# Patient Record
Sex: FEMALE | Race: WHITE | NOT HISPANIC OR LATINO | URBAN - METROPOLITAN AREA
[De-identification: names, ages, dates, MRNs, and addresses within clinical notes are randomized per-mention and may not be internally consistent; named-entity substitution may affect disease eponyms.]

---

## 2022-03-07 ENCOUNTER — INPATIENT (INPATIENT)
Facility: HOSPITAL | Age: 32
LOS: 1 days | Discharge: HOME | End: 2022-03-09
Attending: OBSTETRICS & GYNECOLOGY | Admitting: OBSTETRICS & GYNECOLOGY
Payer: COMMERCIAL

## 2022-03-07 ENCOUNTER — RESULT REVIEW (OUTPATIENT)
Age: 32
End: 2022-03-07

## 2022-03-07 VITALS
HEART RATE: 122 BPM | SYSTOLIC BLOOD PRESSURE: 146 MMHG | DIASTOLIC BLOOD PRESSURE: 84 MMHG | RESPIRATION RATE: 20 BRPM | OXYGEN SATURATION: 99 %

## 2022-03-07 LAB
ALBUMIN SERPL ELPH-MCNC: 3.5 G/DL — SIGNIFICANT CHANGE UP (ref 3.5–5.2)
ALBUMIN SERPL ELPH-MCNC: 3.5 G/DL — SIGNIFICANT CHANGE UP (ref 3.5–5.2)
ALP SERPL-CCNC: 79 U/L — SIGNIFICANT CHANGE UP (ref 30–115)
ALP SERPL-CCNC: 81 U/L — SIGNIFICANT CHANGE UP (ref 30–115)
ALT FLD-CCNC: 28 U/L — SIGNIFICANT CHANGE UP (ref 0–41)
ALT FLD-CCNC: 30 U/L — SIGNIFICANT CHANGE UP (ref 0–41)
AMPHET UR-MCNC: NEGATIVE — SIGNIFICANT CHANGE UP
ANION GAP SERPL CALC-SCNC: 11 MMOL/L — SIGNIFICANT CHANGE UP (ref 7–14)
ANION GAP SERPL CALC-SCNC: 12 MMOL/L — SIGNIFICANT CHANGE UP (ref 7–14)
APPEARANCE UR: CLEAR — SIGNIFICANT CHANGE UP
APTT BLD: 32 SEC — SIGNIFICANT CHANGE UP (ref 27–39.2)
APTT BLD: 32 SEC — SIGNIFICANT CHANGE UP (ref 27–39.2)
AST SERPL-CCNC: 19 U/L — SIGNIFICANT CHANGE UP (ref 0–41)
AST SERPL-CCNC: 19 U/L — SIGNIFICANT CHANGE UP (ref 0–41)
BACTERIA # UR AUTO: NEGATIVE — SIGNIFICANT CHANGE UP
BARBITURATES UR SCN-MCNC: NEGATIVE — SIGNIFICANT CHANGE UP
BASOPHILS # BLD AUTO: 0.05 K/UL — SIGNIFICANT CHANGE UP (ref 0–0.2)
BASOPHILS # BLD AUTO: 0.06 K/UL — SIGNIFICANT CHANGE UP (ref 0–0.2)
BASOPHILS # BLD AUTO: 0.06 K/UL — SIGNIFICANT CHANGE UP (ref 0–0.2)
BASOPHILS NFR BLD AUTO: 0.2 % — SIGNIFICANT CHANGE UP (ref 0–1)
BASOPHILS NFR BLD AUTO: 0.4 % — SIGNIFICANT CHANGE UP (ref 0–1)
BASOPHILS NFR BLD AUTO: 0.4 % — SIGNIFICANT CHANGE UP (ref 0–1)
BENZODIAZ UR-MCNC: NEGATIVE — SIGNIFICANT CHANGE UP
BILIRUB SERPL-MCNC: <0.2 MG/DL — SIGNIFICANT CHANGE UP (ref 0.2–1.2)
BILIRUB SERPL-MCNC: <0.2 MG/DL — SIGNIFICANT CHANGE UP (ref 0.2–1.2)
BILIRUB UR-MCNC: NEGATIVE — SIGNIFICANT CHANGE UP
BLD GP AB SCN SERPL QL: SIGNIFICANT CHANGE UP
BUN SERPL-MCNC: 12 MG/DL — SIGNIFICANT CHANGE UP (ref 10–20)
BUN SERPL-MCNC: 13 MG/DL — SIGNIFICANT CHANGE UP (ref 10–20)
BUPRENORPHINE SCREEN, URINE RESULT: NEGATIVE — SIGNIFICANT CHANGE UP
C TRACH RRNA SPEC QL NAA+PROBE: SIGNIFICANT CHANGE UP
CALCIUM SERPL-MCNC: 9.6 MG/DL — SIGNIFICANT CHANGE UP (ref 8.5–10.1)
CALCIUM SERPL-MCNC: 9.6 MG/DL — SIGNIFICANT CHANGE UP (ref 8.5–10.1)
CHLORIDE SERPL-SCNC: 103 MMOL/L — SIGNIFICANT CHANGE UP (ref 98–110)
CHLORIDE SERPL-SCNC: 105 MMOL/L — SIGNIFICANT CHANGE UP (ref 98–110)
CO2 SERPL-SCNC: 20 MMOL/L — SIGNIFICANT CHANGE UP (ref 17–32)
CO2 SERPL-SCNC: 20 MMOL/L — SIGNIFICANT CHANGE UP (ref 17–32)
COCAINE METAB.OTHER UR-MCNC: NEGATIVE — SIGNIFICANT CHANGE UP
COLOR SPEC: SIGNIFICANT CHANGE UP
CREAT ?TM UR-MCNC: 74 MG/DL — SIGNIFICANT CHANGE UP
CREAT SERPL-MCNC: 0.6 MG/DL — LOW (ref 0.7–1.5)
CREAT SERPL-MCNC: 0.6 MG/DL — LOW (ref 0.7–1.5)
DIFF PNL FLD: ABNORMAL
EGFR: 121 ML/MIN/1.73M2 — SIGNIFICANT CHANGE UP
EGFR: 121 ML/MIN/1.73M2 — SIGNIFICANT CHANGE UP
EOSINOPHIL # BLD AUTO: 0.01 K/UL — SIGNIFICANT CHANGE UP (ref 0–0.7)
EOSINOPHIL # BLD AUTO: 0.08 K/UL — SIGNIFICANT CHANGE UP (ref 0–0.7)
EOSINOPHIL # BLD AUTO: 0.18 K/UL — SIGNIFICANT CHANGE UP (ref 0–0.7)
EOSINOPHIL NFR BLD AUTO: 0 % — SIGNIFICANT CHANGE UP (ref 0–8)
EOSINOPHIL NFR BLD AUTO: 0.5 % — SIGNIFICANT CHANGE UP (ref 0–8)
EOSINOPHIL NFR BLD AUTO: 1.1 % — SIGNIFICANT CHANGE UP (ref 0–8)
EPI CELLS # UR: 3 /HPF — SIGNIFICANT CHANGE UP (ref 0–5)
FENTANYL UR QL: NEGATIVE — SIGNIFICANT CHANGE UP
GLUCOSE SERPL-MCNC: 88 MG/DL — SIGNIFICANT CHANGE UP (ref 70–99)
GLUCOSE SERPL-MCNC: 89 MG/DL — SIGNIFICANT CHANGE UP (ref 70–99)
GLUCOSE UR QL: NEGATIVE — SIGNIFICANT CHANGE UP
HBV SURFACE AG SER-ACNC: SIGNIFICANT CHANGE UP
HCT VFR BLD CALC: 29.5 % — LOW (ref 37–47)
HCT VFR BLD CALC: 32.8 % — LOW (ref 37–47)
HCT VFR BLD CALC: 35 % — LOW (ref 37–47)
HGB BLD-MCNC: 10.3 G/DL — LOW (ref 12–16)
HGB BLD-MCNC: 11.1 G/DL — LOW (ref 12–16)
HGB BLD-MCNC: 11.8 G/DL — LOW (ref 12–16)
HIV 1 & 2 AB SERPL IA.RAPID: SIGNIFICANT CHANGE UP
HYALINE CASTS # UR AUTO: 2 /LPF — SIGNIFICANT CHANGE UP (ref 0–7)
IMM GRANULOCYTES NFR BLD AUTO: 0.6 % — HIGH (ref 0.1–0.3)
IMM GRANULOCYTES NFR BLD AUTO: 0.7 % — HIGH (ref 0.1–0.3)
IMM GRANULOCYTES NFR BLD AUTO: 0.7 % — HIGH (ref 0.1–0.3)
INR BLD: 0.91 RATIO — SIGNIFICANT CHANGE UP (ref 0.65–1.3)
INR BLD: 0.93 RATIO — SIGNIFICANT CHANGE UP (ref 0.65–1.3)
KETONES UR-MCNC: NEGATIVE — SIGNIFICANT CHANGE UP
L&D DRUG SCREEN, URINE: SIGNIFICANT CHANGE UP
LDH SERPL L TO P-CCNC: 131 — SIGNIFICANT CHANGE UP (ref 50–242)
LEUKOCYTE ESTERASE UR-ACNC: NEGATIVE — SIGNIFICANT CHANGE UP
LYMPHOCYTES # BLD AUTO: 1.27 K/UL — SIGNIFICANT CHANGE UP (ref 1.2–3.4)
LYMPHOCYTES # BLD AUTO: 14.6 % — LOW (ref 20.5–51.1)
LYMPHOCYTES # BLD AUTO: 2.46 K/UL — SIGNIFICANT CHANGE UP (ref 1.2–3.4)
LYMPHOCYTES # BLD AUTO: 20 % — LOW (ref 20.5–51.1)
LYMPHOCYTES # BLD AUTO: 3.41 K/UL — HIGH (ref 1.2–3.4)
LYMPHOCYTES # BLD AUTO: 4.9 % — LOW (ref 20.5–51.1)
MCHC RBC-ENTMCNC: 28.8 PG — SIGNIFICANT CHANGE UP (ref 27–31)
MCHC RBC-ENTMCNC: 29.1 PG — SIGNIFICANT CHANGE UP (ref 27–31)
MCHC RBC-ENTMCNC: 29.7 PG — SIGNIFICANT CHANGE UP (ref 27–31)
MCHC RBC-ENTMCNC: 33.7 G/DL — SIGNIFICANT CHANGE UP (ref 32–37)
MCHC RBC-ENTMCNC: 33.8 G/DL — SIGNIFICANT CHANGE UP (ref 32–37)
MCHC RBC-ENTMCNC: 34.9 G/DL — SIGNIFICANT CHANGE UP (ref 32–37)
MCV RBC AUTO: 85 FL — SIGNIFICANT CHANGE UP (ref 81–99)
MCV RBC AUTO: 85.4 FL — SIGNIFICANT CHANGE UP (ref 81–99)
MCV RBC AUTO: 86.1 FL — SIGNIFICANT CHANGE UP (ref 81–99)
METHADONE UR-MCNC: NEGATIVE — SIGNIFICANT CHANGE UP
MEV IGG SER-ACNC: 107 AU/ML — SIGNIFICANT CHANGE UP
MEV IGG+IGM SER-IMP: POSITIVE — SIGNIFICANT CHANGE UP
MONOCYTES # BLD AUTO: 0.67 K/UL — HIGH (ref 0.1–0.6)
MONOCYTES # BLD AUTO: 0.86 K/UL — HIGH (ref 0.1–0.6)
MONOCYTES # BLD AUTO: 1.04 K/UL — HIGH (ref 0.1–0.6)
MONOCYTES NFR BLD AUTO: 2.6 % — SIGNIFICANT CHANGE UP (ref 1.7–9.3)
MONOCYTES NFR BLD AUTO: 5.1 % — SIGNIFICANT CHANGE UP (ref 1.7–9.3)
MONOCYTES NFR BLD AUTO: 6.1 % — SIGNIFICANT CHANGE UP (ref 1.7–9.3)
N GONORRHOEA RRNA SPEC QL NAA+PROBE: SIGNIFICANT CHANGE UP
NEUTROPHILS # BLD AUTO: 12.26 K/UL — HIGH (ref 1.4–6.5)
NEUTROPHILS # BLD AUTO: 13.28 K/UL — HIGH (ref 1.4–6.5)
NEUTROPHILS # BLD AUTO: 23.75 K/UL — HIGH (ref 1.4–6.5)
NEUTROPHILS NFR BLD AUTO: 71.8 % — SIGNIFICANT CHANGE UP (ref 42.2–75.2)
NEUTROPHILS NFR BLD AUTO: 78.7 % — HIGH (ref 42.2–75.2)
NEUTROPHILS NFR BLD AUTO: 91.6 % — HIGH (ref 42.2–75.2)
NITRITE UR-MCNC: NEGATIVE — SIGNIFICANT CHANGE UP
NRBC # BLD: 0 /100 WBCS — SIGNIFICANT CHANGE UP (ref 0–0)
OPIATES UR-MCNC: NEGATIVE — SIGNIFICANT CHANGE UP
OXYCODONE UR-MCNC: NEGATIVE — SIGNIFICANT CHANGE UP
PCP UR-MCNC: NEGATIVE — SIGNIFICANT CHANGE UP
PH UR: 6 — SIGNIFICANT CHANGE UP (ref 5–8)
PLATELET # BLD AUTO: 355 K/UL — SIGNIFICANT CHANGE UP (ref 130–400)
PLATELET # BLD AUTO: 365 K/UL — SIGNIFICANT CHANGE UP (ref 130–400)
PLATELET # BLD AUTO: 370 K/UL — SIGNIFICANT CHANGE UP (ref 130–400)
POTASSIUM SERPL-MCNC: 4 MMOL/L — SIGNIFICANT CHANGE UP (ref 3.5–5)
POTASSIUM SERPL-MCNC: 4.2 MMOL/L — SIGNIFICANT CHANGE UP (ref 3.5–5)
POTASSIUM SERPL-SCNC: 4 MMOL/L — SIGNIFICANT CHANGE UP (ref 3.5–5)
POTASSIUM SERPL-SCNC: 4.2 MMOL/L — SIGNIFICANT CHANGE UP (ref 3.5–5)
PROPOXYPHENE QUALITATIVE URINE RESULT: NEGATIVE — SIGNIFICANT CHANGE UP
PROT ?TM UR-MCNC: 12 MG/DLG/24H — SIGNIFICANT CHANGE UP
PROT SERPL-MCNC: 6.1 G/DL — SIGNIFICANT CHANGE UP (ref 6–8)
PROT SERPL-MCNC: 6.2 G/DL — SIGNIFICANT CHANGE UP (ref 6–8)
PROT UR-MCNC: NEGATIVE — SIGNIFICANT CHANGE UP
PROT/CREAT UR-RTO: 0.2 RATIO — SIGNIFICANT CHANGE UP (ref 0–0.2)
PROTHROM AB SERPL-ACNC: 10.5 SEC — SIGNIFICANT CHANGE UP (ref 9.95–12.87)
PROTHROM AB SERPL-ACNC: 10.7 SEC — SIGNIFICANT CHANGE UP (ref 9.95–12.87)
RBC # BLD: 3.47 M/UL — LOW (ref 4.2–5.4)
RBC # BLD: 3.81 M/UL — LOW (ref 4.2–5.4)
RBC # BLD: 4.1 M/UL — LOW (ref 4.2–5.4)
RBC # FLD: 14.9 % — HIGH (ref 11.5–14.5)
RBC # FLD: 15.1 % — HIGH (ref 11.5–14.5)
RBC # FLD: 15.2 % — HIGH (ref 11.5–14.5)
RBC CASTS # UR COMP ASSIST: 4 /HPF — SIGNIFICANT CHANGE UP (ref 0–4)
RUBV IGG SER-ACNC: 5.6 INDEX — SIGNIFICANT CHANGE UP
RUBV IGG SER-IMP: POSITIVE — SIGNIFICANT CHANGE UP
SARS-COV-2 RNA SPEC QL NAA+PROBE: SIGNIFICANT CHANGE UP
SODIUM SERPL-SCNC: 135 MMOL/L — SIGNIFICANT CHANGE UP (ref 135–146)
SODIUM SERPL-SCNC: 136 MMOL/L — SIGNIFICANT CHANGE UP (ref 135–146)
SP GR SPEC: 1.01 — SIGNIFICANT CHANGE UP (ref 1.01–1.03)
SPECIMEN SOURCE: SIGNIFICANT CHANGE UP
T PALLIDUM AB TITR SER: NEGATIVE — SIGNIFICANT CHANGE UP
URATE SERPL-MCNC: 3.3 MG/DL — SIGNIFICANT CHANGE UP (ref 2.5–7)
UROBILINOGEN FLD QL: SIGNIFICANT CHANGE UP
WBC # BLD: 16.86 K/UL — HIGH (ref 4.8–10.8)
WBC # BLD: 17.05 K/UL — HIGH (ref 4.8–10.8)
WBC # BLD: 25.94 K/UL — HIGH (ref 4.8–10.8)
WBC # FLD AUTO: 16.86 K/UL — HIGH (ref 4.8–10.8)
WBC # FLD AUTO: 17.05 K/UL — HIGH (ref 4.8–10.8)
WBC # FLD AUTO: 25.94 K/UL — HIGH (ref 4.8–10.8)
WBC UR QL: 1 /HPF — SIGNIFICANT CHANGE UP (ref 0–5)

## 2022-03-07 PROCEDURE — 99222 1ST HOSP IP/OBS MODERATE 55: CPT | Mod: 25

## 2022-03-07 PROCEDURE — 76815 OB US LIMITED FETUS(S): CPT | Mod: 26

## 2022-03-07 PROCEDURE — 59025 FETAL NON-STRESS TEST: CPT | Mod: 26

## 2022-03-07 PROCEDURE — 59514 CESAREAN DELIVERY ONLY: CPT | Mod: U7

## 2022-03-07 PROCEDURE — 88305 TISSUE EXAM BY PATHOLOGIST: CPT | Mod: 26

## 2022-03-07 PROCEDURE — 99285 EMERGENCY DEPT VISIT HI MDM: CPT | Mod: 25

## 2022-03-07 RX ORDER — CEFAZOLIN SODIUM 1 G
2000 VIAL (EA) INJECTION ONCE
Refills: 0 | Status: DISCONTINUED | OUTPATIENT
Start: 2022-03-07 | End: 2022-03-08

## 2022-03-07 RX ORDER — SENNA PLUS 8.6 MG/1
2 TABLET ORAL AT BEDTIME
Refills: 0 | Status: DISCONTINUED | OUTPATIENT
Start: 2022-03-07 | End: 2022-03-09

## 2022-03-07 RX ORDER — SODIUM CHLORIDE 9 MG/ML
1000 INJECTION, SOLUTION INTRAVENOUS
Refills: 0 | Status: DISCONTINUED | OUTPATIENT
Start: 2022-03-07 | End: 2022-03-07

## 2022-03-07 RX ORDER — SODIUM CHLORIDE 9 MG/ML
1000 INJECTION, SOLUTION INTRAVENOUS
Refills: 0 | Status: DISCONTINUED | OUTPATIENT
Start: 2022-03-07 | End: 2022-03-08

## 2022-03-07 RX ORDER — SIMETHICONE 80 MG/1
80 TABLET, CHEWABLE ORAL EVERY 4 HOURS
Refills: 0 | Status: DISCONTINUED | OUTPATIENT
Start: 2022-03-07 | End: 2022-03-07

## 2022-03-07 RX ORDER — ENOXAPARIN SODIUM 100 MG/ML
40 INJECTION SUBCUTANEOUS EVERY 24 HOURS
Refills: 0 | Status: DISCONTINUED | OUTPATIENT
Start: 2022-03-07 | End: 2022-03-09

## 2022-03-07 RX ORDER — OXYTOCIN 10 UNIT/ML
333.33 VIAL (ML) INJECTION
Qty: 20 | Refills: 0 | Status: DISCONTINUED | OUTPATIENT
Start: 2022-03-07 | End: 2022-03-09

## 2022-03-07 RX ORDER — TETANUS TOXOID, REDUCED DIPHTHERIA TOXOID AND ACELLULAR PERTUSSIS VACCINE, ADSORBED 5; 2.5; 8; 8; 2.5 [IU]/.5ML; [IU]/.5ML; UG/.5ML; UG/.5ML; UG/.5ML
0.5 SUSPENSION INTRAMUSCULAR ONCE
Refills: 0 | Status: DISCONTINUED | OUTPATIENT
Start: 2022-03-07 | End: 2022-03-09

## 2022-03-07 RX ORDER — MAGNESIUM HYDROXIDE 400 MG/1
30 TABLET, CHEWABLE ORAL
Refills: 0 | Status: DISCONTINUED | OUTPATIENT
Start: 2022-03-07 | End: 2022-03-09

## 2022-03-07 RX ORDER — SIMETHICONE 80 MG/1
80 TABLET, CHEWABLE ORAL EVERY 4 HOURS
Refills: 0 | Status: DISCONTINUED | OUTPATIENT
Start: 2022-03-07 | End: 2022-03-09

## 2022-03-07 RX ORDER — IBUPROFEN 200 MG
600 TABLET ORAL EVERY 6 HOURS
Refills: 0 | Status: DISCONTINUED | OUTPATIENT
Start: 2022-03-07 | End: 2022-03-09

## 2022-03-07 RX ORDER — OXYTOCIN 10 UNIT/ML
333.33 VIAL (ML) INJECTION
Qty: 20 | Refills: 0 | Status: DISCONTINUED | OUTPATIENT
Start: 2022-03-07 | End: 2022-03-07

## 2022-03-07 RX ORDER — ACETAMINOPHEN 500 MG
975 TABLET ORAL
Refills: 0 | Status: DISCONTINUED | OUTPATIENT
Start: 2022-03-07 | End: 2022-03-09

## 2022-03-07 RX ORDER — KETOROLAC TROMETHAMINE 30 MG/ML
30 SYRINGE (ML) INJECTION EVERY 6 HOURS
Refills: 0 | Status: COMPLETED | OUTPATIENT
Start: 2022-03-07 | End: 2022-03-07

## 2022-03-07 RX ORDER — IBUPROFEN 200 MG
600 TABLET ORAL EVERY 6 HOURS
Refills: 0 | Status: COMPLETED | OUTPATIENT
Start: 2022-03-07 | End: 2023-02-03

## 2022-03-07 RX ORDER — OXYCODONE HYDROCHLORIDE 5 MG/1
5 TABLET ORAL ONCE
Refills: 0 | Status: DISCONTINUED | OUTPATIENT
Start: 2022-03-07 | End: 2022-03-09

## 2022-03-07 RX ORDER — INFLUENZA VIRUS VACCINE 15; 15; 15; 15 UG/.5ML; UG/.5ML; UG/.5ML; UG/.5ML
0.5 SUSPENSION INTRAMUSCULAR ONCE
Refills: 0 | Status: DISCONTINUED | OUTPATIENT
Start: 2022-03-07 | End: 2022-03-09

## 2022-03-07 RX ORDER — LANOLIN
1 OINTMENT (GRAM) TOPICAL EVERY 6 HOURS
Refills: 0 | Status: DISCONTINUED | OUTPATIENT
Start: 2022-03-07 | End: 2022-03-09

## 2022-03-07 RX ORDER — OXYCODONE HYDROCHLORIDE 5 MG/1
5 TABLET ORAL
Refills: 0 | Status: DISCONTINUED | OUTPATIENT
Start: 2022-03-07 | End: 2022-03-09

## 2022-03-07 RX ORDER — DIPHENHYDRAMINE HCL 50 MG
25 CAPSULE ORAL EVERY 6 HOURS
Refills: 0 | Status: DISCONTINUED | OUTPATIENT
Start: 2022-03-07 | End: 2022-03-09

## 2022-03-07 RX ADMIN — Medication 600 MILLIGRAM(S): at 14:15

## 2022-03-07 RX ADMIN — SIMETHICONE 80 MILLIGRAM(S): 80 TABLET, CHEWABLE ORAL at 21:42

## 2022-03-07 RX ADMIN — Medication 30 MILLIGRAM(S): at 08:39

## 2022-03-07 RX ADMIN — Medication 12 MILLIGRAM(S): at 03:53

## 2022-03-07 RX ADMIN — ENOXAPARIN SODIUM 40 MILLIGRAM(S): 100 INJECTION SUBCUTANEOUS at 18:55

## 2022-03-07 RX ADMIN — Medication 600 MILLIGRAM(S): at 14:16

## 2022-03-07 RX ADMIN — Medication 975 MILLIGRAM(S): at 21:42

## 2022-03-07 RX ADMIN — Medication 30 MILLIGRAM(S): at 10:25

## 2022-03-07 RX ADMIN — SENNA PLUS 2 TABLET(S): 8.6 TABLET ORAL at 21:42

## 2022-03-07 RX ADMIN — SIMETHICONE 80 MILLIGRAM(S): 80 TABLET, CHEWABLE ORAL at 18:48

## 2022-03-07 NOTE — CHART NOTE - NSCHARTNOTEFT_GEN_A_CORE
OBGYN PGY2 Note    Prenatal charts faxed over from PMD's office (). Charts reviewed.     Final ITALIA: 6/27/22 from prenatal charts faxed, method of dating (LMP vs sonogram) not indicated on chart     Labs:   12/14  Varicella non immune  HIV NR   HepBsAg NR  RPR NR  Rubella immune     12/28   Cystic fibrosis carrier     2/9/22  AB pos    2/11/22  Prenatal FISH analysis - normal     2/23  Amniotic fluid chromosome analysis: 46, XY, normal male karyotype  prenatal     sonograms:   12/22/21 subchorionic hematoma 1.5x 0.9cm  1/5/22 no hematoma visualized   2/8/22 EGA 20w1d, EFW 255g (3%), breech, anterior placenta, anatomy survey appears normal, UA S/D absent end diastolic flow   2/23/22 EGA 22w2d, cephalic, fundal placenta, amniotic fluid normal, AC <3%ile, EFW 341g (<3%) cervix CL 38.8mm, fetal anatomic survey visually normal, UA absent end diastolic flow   3/2/22 EGA 23w2d, cephalic, fundal placenta, no previa, MVP 73.9mm, normal stomach, kidneys, bladder, UA S/D ratio = 5.1 (elevated)     and  aware

## 2022-03-07 NOTE — BRIEF OPERATIVE NOTE - NSICDXBRIEFPREOP_GEN_ALL_CORE_FT
PRE-OP DIAGNOSIS:  Category II fetal heart rate tracing during labor and delivery 07-Mar-2022 06:08:35  Niurka Tabares  Placental abruption, second trimester 07-Mar-2022 06:08:14 suspected Niurka Tabares  24 weeks gestation of pregnancy 07-Mar-2022 06:08:05  Niurka Tabares  Vaginal bleeding 07-Mar-2022 06:07:56  Niurka Tabares

## 2022-03-07 NOTE — ED PROVIDER NOTE - OBJECTIVE STATEMENT
33 yo female  24 weeks (ITALIA - 22) pregnant present c/o sudden onset of vaginal bleeding. Reported she woke up with a blood pooling on bed. her GYN is from [facility] in [place]. Santos abdominal pain and cramping associate with bleeding. denies trauma to abdomen. Also reports she was seen by high risk doctor because her placenta was not growing appropriately at around 18 weeks and she had a sonogram last week and was told it is normal now.

## 2022-03-07 NOTE — OB RN DELIVERY SUMMARY - NS_GENERALBABYACOMMENTA_OBGYN_ALL_OB_FT
Specimen obtained by Dr. Jaramillo during procedure to be sent to pathology to r/o accessory lobe placenta

## 2022-03-07 NOTE — CHART NOTE - NSCHARTNOTEFT_GEN_A_CORE
PACU ANESTHESIA ADMISSION NOTE      Procedure:  c- section - emergent   Post op diagnosis:   placental abruption -> fetal demise     ____  Intubated  TV:______       Rate: ______      FiO2: ______    __x__  Patent Airway    __x__  Full return of protective reflexes    __x__  Full recovery from anesthesia / back to baseline status        Mental Status:  __x__ Awake   ___x__ Alert   _____ Drowsy   _____ Sedated    Nausea/Vomiting:  __x__ NO  ______Yes,   See Post - Op Orders          Pain Scale (0-10):  __0___    Treatment: ____ None    __x__ See Post - Op/PCA Orders    Post - Operative Fluids:   ____ Oral   __x__ See Post - Op Orders    Plan: Discharge:   ____Home       __x___Floor     _____Critical Care    _____  Other:_________________    Comments: Patient had smooth intraoperative event, no anesthesia complication.  PACU Vital signs: HR:    88        BP:   132     /   67       RR:     16        O2 Sat: 99      %

## 2022-03-07 NOTE — OB PROVIDER H&P - NSHPPHYSICALEXAM_GEN_ALL_CORE
Vital Signs Last 24 Hrs  T(C): 36.9 (07 Mar 2022 01:58), Max: 36.9 (07 Mar 2022 01:58)  T(F): 98.4 (07 Mar 2022 01:58), Max: 98.4 (07 Mar 2022 01:58)  HR: 93 (07 Mar 2022 03:45) (82 - 122)  BP: 131/78 (07 Mar 2022 03:45) (119/66 - 146/84)  RR: 20 (07 Mar 2022 01:58) (20 - 20)  SpO2: 98% (07 Mar 2022 03:43) (96% - 99%)    Gen: AOx3, NAD  EFM: 150/moderate, late decels noted with contractions  [place]: q3-4m  SVE 3/7 @0215 : 0/0/-3  Speculum: 5cc bright red blood, neg ferning  BSS: CL 4.11, vertex, MVP 4.5, +movement, +FH, EFW 417g, fundal placenta, no evidence of abruption

## 2022-03-07 NOTE — PROGRESS NOTE ADULT - SUBJECTIVE AND OBJECTIVE BOX
33y/o  @24 weeks admitted with vaginal bleeding  Pt is ctx'ing  ddx: Abruption vs labor  Will Admit   steroids  will not give tocolytics due to abruption  hb 11  Explained to pt  possible need for delivery  risks: Anesthesia, infection, bleeding, transfusion, injury to other organs-bowel/bladder, dvt/pe, future pregnancy risks  discussed prematurity and risks to be explained by peds  all questions answered

## 2022-03-07 NOTE — BRIEF OPERATIVE NOTE - NSICDXBRIEFPOSTOP_GEN_ALL_CORE_FT
POST-OP DIAGNOSIS:  Vaginal bleeding 07-Mar-2022 06:08:48  Niurka Tabares  24 weeks gestation of pregnancy 07-Mar-2022 06:08:44  Niurka Tabares  Placental abruption, second trimester 07-Mar-2022 06:08:40 suspected Niurka Tabares  Category II fetal heart rate tracing during labor and delivery 07-Mar-2022 06:08:38  Niurka Tabares

## 2022-03-07 NOTE — CHART NOTE - NSCHARTNOTEFT_GEN_A_CORE
PGY3 Note    Patient evaluated at bedside for recurrent late decelerations.  Resuscitated with O2 and fluid bolus.  Continues to deny contractions or abdominal pain. Good fetal movement.  Continues to report intermittent vaginal bleeding.    Vital Signs Last 24 Hrs  T(C): 36.9 (07 Mar 2022 01:58), Max: 36.9 (07 Mar 2022 01:58)  T(F): 98.4 (07 Mar 2022 01:58), Max: 98.4 (07 Mar 2022 01:58)  HR: 97 (07 Mar 2022 04:08) (82 - 122)  BP: 141/92 (07 Mar 2022 03:59) (119/66 - 146/84)  BP(mean): --  RR: 20 (07 Mar 2022 01:58) (20 - 20)  SpO2: 100% (07 Mar 2022 04:08) (96% - 100%)    EFM: 150/mod/no accel/intermittent late decelerations  toco: q3-4m      A/P: 31yo  @ 24weeks, GBS unknown, on aspirin, with vaginal bleeding, high suspicion for placental abruption.    - MFM Dr. Rincon contacted, celestone administration recommended.  Delivery recommended if fetal heart rate abnormality persists.  - NPO/IVF  - cont efm/toco  - urgent NICU consult  - anesthesia made aware  - celestone for fetal lung maturity      Dr. Jaramillo aware and at bedside.

## 2022-03-07 NOTE — OB PROVIDER DELIVERY SUMMARY - NSSELHIDDEN_OBGYN_ALL_OB_FT
[NS_[email or web address]_ALL_OB_FT:[email or web address]],[NS_DeliveryAssist1_OBGYN_ALL_OB_FT:[id number]=],[NS_DeliveryRN_OBGYN_ALL_OB_FT:[email or web address]=],[NS_CirculateRN2_OBGYN_ALL_OB_FT:[email or web address]=]

## 2022-03-07 NOTE — OB RN TRIAGE NOTE - NS_ARRIVALINFOCOMMENT_OBGYN_ALL_OB_FT
pt transferred from [facility] via ambulance and stretcher from [facility] ED

## 2022-03-07 NOTE — OB RN INTRAOPERATIVE NOTE - NS_DURAMORPH_OBGYN_ALL_OB
REVIEW OF SYSTEMS:    CONSTITUTIONAL: No weakness, fevers or chills  EYES/ENT: No visual changes;  No vertigo or throat pain   NECK: No pain or stiffness  RESPIRATORY: No cough, wheezing, hemoptysis; + shortness of breath  CARDIOVASCULAR: No chest pain or palpitations  GASTROINTESTINAL: . No nausea, vomiting, or hematemesis; No diarrhea or constipation. No melena or hematochezia. +abdominal pain  GENITOURINARY: No dysuria, frequency or hematuria  NEUROLOGICAL: No numbness or weakness  SKIN: No itching, rashes
with h/o CAD and AAA (5.2cm)   cont Metoprolol, Ramapril, Isosorbide, and Aspirin with holding parameters
No

## 2022-03-07 NOTE — PROGRESS NOTE ADULT - SUBJECTIVE AND OBJECTIVE BOX
PGY1 note  Chief Complaint: Post  section    Patient seen and examined. Pain well controlled at this time. No complaints at this time. Denies fever, chills, nausea, vomiting, chest pain, shortness of breath, severe abdominal pain, heavy vaginal bleeding. Patient is OOB to chair, tolerating PO, spicer in place, denies flatus.       Physical Exam  Vital Signs Last 24 Hrs  T(F): 98.4 (07 Mar 2022 07:18), Max: 98.4 (07 Mar 2022 01:58)  HR: 93 (07 Mar 2022 11:17) (72 - 122)  BP: 116/68 (07 Mar 2022 11:17) (114/64 - 146/84)  RR: 14 (07 Mar 2022 04:21) (14 - 20)    Physical exam:  General - AAOx3, NAD  Heart - S1S2, RRR  Lungs - CTA BL  Abdomen:  - Soft, appropriately tender, mildly distended, BS+. Clean, dry, intact dressing in place over pfannenstiel skin incision.  - Fundus firm, appropriately tender, below the umbilicus  - No rebound or guarding  Pelvis/Vagina - Minimal lochia  Extremities - No calf tenderness, no swelling    (8963-2505) 150, (8347-1074) 150, (8341-6657) 150    Labs:                        11.1   16.86 )-----------( 365      ( 07 Mar 2022 04:01 )             32.8                         11.8   17.05 )-----------( 370      ( 07 Mar 2022 00:25 )             35.0     Antibody Screen: NEG (22 @ 00:25)    ABO RH Interpretation: AB POS (22 @ 00:25)    Antibody Screen: NEG (22 @ 00:25)    MEDICATIONS  (STANDING):  acetaminophen     Tablet .. 975 milliGRAM(s) Oral <User Schedule>  ceFAZolin   IVPB 2000 milliGRAM(s) IV Intermittent once  diphtheria/tetanus/pertussis (acellular) Vaccine (ADAcel) 0.5 milliLiter(s) IntraMuscular once  enoxaparin Injectable 40 milliGRAM(s) SubCutaneous every 24 hours  ibuprofen  Tablet. 600 milliGRAM(s) Oral every 6 hours  ketorolac   Injectable 30 milliGRAM(s) IV Push every 6 hours  lactated ringers. 1000 milliLiter(s) (125 mL/Hr) IV Continuous <Continuous>  oxytocin Infusion 333.333 milliUNIT(s)/Min (1000 mL/Hr) IV Continuous <Continuous>  senna 2 Tablet(s) Oral at bedtime  simethicone 80 milliGRAM(s) Chew every 4 hours    MEDICATIONS  (PRN):  diphenhydrAMINE 25 milliGRAM(s) Oral every 6 hours PRN Pruritus  lanolin Ointment 1 Application(s) Topical every 6 hours PRN Sore Nipples  magnesium hydroxide Suspension 30 milliLiter(s) Oral two times a day PRN Constipation  oxyCODONE    IR 5 milliGRAM(s) Oral every 3 hours PRN Moderate to Severe Pain (4-10)  oxyCODONE    IR 5 milliGRAM(s) Oral once PRN Moderate to Severe Pain (4-10)

## 2022-03-07 NOTE — ED PROVIDER NOTE - PHYSICAL EXAMINATION
CONSTITUTIONAL: Well-appearing; well-nourished; in no apparent distress.   EYES: PERRL; EOM intact.   CARDIOVASCULAR: Normal S1, S2; no murmurs, rubs, or gallops.   RESPIRATORY: Normal chest excursion with respiration; breath sounds clear and equal bilaterally; no wheezes, rhonchi, or rales.  GI: Normal bowel sounds; non-distended; non-tender; no palpable organomegaly.   MS: No calf swelling and tenderness.  SKIN: Normal for age and race; warm; dry; good turgor; no apparent lesions or exudate.   NEURO/PSYCH: A & O x 4; grossly unremarkable.

## 2022-03-07 NOTE — OB RN INTRAOPERATIVE NOTE - NS_ADDITIONALPROCINFO1_OBGYN_ALL_OB_FT
After pt placed down from spinal, EFM placed on abdomen and no FHR audible at this time after much effort.  Dr. Jaramillo at bedside of OR table at this time and called a STAT csection.

## 2022-03-07 NOTE — OB PROVIDER H&P - ATTENDING COMMENTS
See progress Note I wrote  nst non-reassuring See progress Note I wrote  nst non-reassuring  sonogram fluid >2cm  efw done

## 2022-03-07 NOTE — OB PROVIDER TRIAGE NOTE - HISTORY OF PRESENT ILLNESS
33y/o  at 24w0d dated by early sonogram with ITALIA 22, presents for an episode of bleeding at 11:40pm. She states she got up to use the bathroom and immediately felt a gush of red blood. She denies ctx, lof.She Feels good fetal movements. Pregnancy has been complicated by subchorionic hematoma, last bleeding episode around 15 weeks. She states she also had a normal amniocentesis this pregnancy, she states she had the amniocentesis for potential abnormalities with her placenta. She follows with Dr. Bedolla at [facility] for ob care. She has been on aspirin this pregnancy, last dose at 10pm last evening. No other complications this pregnancy. GBS unknown.     33y/o  at 24w0d dated by early sonogram with ITALIA 22, presents for an episode of bleeding at 11:40pm. She states she got up to use the bathroom and immediately felt a gush of red blood. She denies ctx, lof.She Feels good fetal movements. Pregnancy has been complicated by subchorionic hematoma, last bleeding episode around 15 weeks. She states she also had a normal amniocentesis this pregnancy, she states she had the amniocentesis for potential abnormalities with her placenta. She follows with Dr. Bedolla at [facility] for ob care. She has been on aspirin this pregnancy, last dose at 10pm last evening. No other complications this pregnancy. GBS unknown.    Last [place]: months ago  BM: yesterday 9pm   33y/o  at 24w0d dated by early sonogram with ITALIA 22, presents for an episode of bleeding at 11:40pm. She states she got up to use the bathroom and immediately felt a gush of red blood. She denies ctx, lof.She Feels good fetal movements. Pregnancy has been complicated by subchorionic hematoma, last bleeding episode around 15 weeks. She states she also had a normal amniocentesis this pregnancy, she states she had the amniocentesis for potential abnormalities with her placenta. She follows with Dr. Bedolla at [facility] for ob care. She has been on aspirin this pregnancy, last dose at 10pm last evening. No other complications this pregnancy. GBS unknown. Denies fevers, chills, SOB, CP, abdominal pain, nausea, vomiting, diarrhea, constipation, dysuria, urinary urgency or urinary frequency    Last [place]: months ago  BM: yesterday 9pm   31y/o  at 24w0d dated by early sonogram with ITALIA 22, presents for an episode of bleeding at 11:40pm. She states she got up to use the bathroom and immediately felt a gush of red blood. She denies ctx, lof.She Feels good fetal movements. Pregnancy has been complicated by subchorionic hematoma, last bleeding episode around 15 weeks. She states she also had a normal amniocentesis this pregnancy, she states she had the amniocentesis for potential abnormalities with her placenta. She follows with Dr. Bedolla at [facility] for ob care. She has been on aspirin this pregnancy, last dose at 10pm last evening. No other complications this pregnancy. GBS unknown. Denies fevers, chills, SOB, CP, abdominal pain, nausea, vomiting, diarrhea, constipation, dysuria, urinary urgency or urinary frequency.    Last [place]: months ago  BM: yesterday 9pm

## 2022-03-07 NOTE — OB PROVIDER TRIAGE NOTE - NSOBPROVIDERNOTE_OBGYN_ALL_OB_FT
33yo  @ 24 weeks, GBS unknown, on aspirin, with vaginal bleeding, r/o subchorionic hematoma bleed vs. placental abruption, current maternal and fetal status reassuring.    - repeat abruption labs in 4 hours  - PELs for elevated BP on admission  - continuous EFM/TOCO  - NPO   - IVF  - f/u GC  - reevaluate    Dr. Tabares and Dr. De Los Santos aware. 31yo  @ 24 weeks, GBS unknown, on aspirin, with vaginal bleeding, r/o subchorionic hematoma bleed vs. placental abruption, current maternal and fetal status reassuring.    - repeat abruption labs in 4 hours  - PELs for elevated BP on admission  - continuous EFM/TOCO  - NPO   - IVF  - pad counts  - f/u GC  - reevaluate    Dr. Tabares and Dr. De Los Santos aware.

## 2022-03-07 NOTE — CHART NOTE - NSCHARTNOTEFT_GEN_A_CORE
Patient seen at bedside, patient not on EFM/TOCO monitor. Patient placed back on monitor.    Dr. Tabares and Dr. De Los Santos aware.

## 2022-03-07 NOTE — OB PROVIDER H&P - ASSESSMENT
33yo  @24w0d, GBS unknown, with vaginal bleeding, r/o subchorionic hematoma vs placental abruption, now with contractions, currently on aspirin, with category II FHR tracing.    -Admit to L&D  -Admission labs, prenatal labs, repeat abruption labs, PELs  -Monitor vitals  -Cont EFM/[place]  -Pain management prn  -NPO/IVF  -pad counts  -f/u GC/CT  -Celestone 12mg q24h, stop after 2 doses  -resuscitative measures including O2 supplementation, IVF bolus,     Dr. Jaramillo and Dr. Tabares aware

## 2022-03-07 NOTE — OB PROVIDER TRIAGE NOTE - NSHPPHYSICALEXAM_GEN_ALL_CORE
Vital Signs Last 24 Hrs  T(C): 36.9 (07 Mar 2022 01:58), Max: 36.9 (07 Mar 2022 01:58)  T(F): 98.4 (07 Mar 2022 01:58), Max: 98.4 (07 Mar 2022 01:58)  HR: 93 (07 Mar 2022 01:58) (93 - 122)  BP: 125/84 (07 Mar 2022 01:58) (125/84 - 146/84)  RR: 20 (07 Mar 2022 01:58) (20 - 20)  SpO2: 99% (07 Mar 2022 00:11) (99% - 99%)    Gen: AOx3, no acute distress  Abd: soft, gravid, non tender, no palpable contractions  Ext: No edema bilaterally    EFM:  140/mod/+accels; cat 1  [place]: not irene  SVE: 0/0/-3   vertex intact Vital Signs Last 24 Hrs  T(C): 36.9 (07 Mar 2022 01:58), Max: 36.9 (07 Mar 2022 01:58)  T(F): 98.4 (07 Mar 2022 01:58), Max: 98.4 (07 Mar 2022 01:58)  HR: 93 (07 Mar 2022 01:58) (93 - 122)  BP: 125/84 (07 Mar 2022 01:58) (125/84 - 146/84)  RR: 20 (07 Mar 2022 01:58) (20 - 20)  SpO2: 99% (07 Mar 2022 00:11) (99% - 99%)    Gen: AOx3, no acute distress  Abd: soft, gravid, non tender, no palpable contractions  Ext: No edema bilaterally  pelvic: 5cc bright red blood in vagina, no active bleeding from os. ferning neg.  BSS: CL 4.11, vertex, MVP 4.5, +movement, +FH, EFW 417g    EFM:  140/mod/+accels; cat 1  [place]: not irene  SVE: 0/0/-3   vertex intact Vital Signs Last 24 Hrs  T(C): 36.9 (07 Mar 2022 01:58), Max: 36.9 (07 Mar 2022 01:58)  T(F): 98.4 (07 Mar 2022 01:58), Max: 98.4 (07 Mar 2022 01:58)  HR: 93 (07 Mar 2022 01:58) (93 - 122)  BP: 125/84 (07 Mar 2022 01:58) (125/84 - 146/84)  RR: 20 (07 Mar 2022 01:58) (20 - 20)  SpO2: 99% (07 Mar 2022 00:11) (99% - 99%)    Gen: AOx3, no acute distress  Abd: soft, gravid, non tender, no palpable contractions, no fundal tenderness  Ext: No edema bilaterally  pelvic: 5cc bright red blood in vagina, no active bleeding from os. ferning neg.  BSS: CL 4.11, vertex, MVP 4.5, +movement, +FH, EFW 417g    EFM:  140/mod/+accels; cat 1  [place]: not irene  SVE: 0/0/-3   vertex intact

## 2022-03-07 NOTE — OB RN INTRAOPERATIVE NOTE - NSSELHIDDEN_OBGYN_ALL_OB_FT
[NS_DeliveryAttending1_OBGYN_ALL_OB_FT:[email or web address]],[NS_DeliveryAssist1_OBGYN_ALL_OB_FT:[id number]],[NS_DeliveryRN_OBGYN_ALL_OB_FT:[email or web address]=],[NS_CirculateRN2_OBGYN_ALL_OB_FT:[email or web address]=]

## 2022-03-07 NOTE — ED PROVIDER NOTE - ATTENDING CONTRIBUTION TO CARE
I personally evaluated the patient. I reviewed the Resident´s or Physician Assistant´s note (as assigned above), and agree with the findings and plan except as documented in my note.  32-year-old female, G1, P0, 24 weeks pregnant, presents with spontaneous vaginal bleeding tonight. No abdominal pain. Exam shows alert patient in no distress, HEENT NCAT PERRL, neck supple, throat no exudates, lungs clear, RR S1S2, abdomen soft gravid uterus +BS, , pelvic +blood in vaginal vault, os closed, no CCE.

## 2022-03-07 NOTE — BRIEF OPERATIVE NOTE - OPERATION/FINDINGS
Low transverse uterine incision.  Normal appearing uterus, tubes and ovaries bilaterally.  Male  delivered in breech presentation, clear amniotic fluid.

## 2022-03-07 NOTE — CHART NOTE - NSCHARTNOTEFT_GEN_A_CORE
PGY3 Note    At this time, due to abnormal fetal heart rate tracing and persistent vaginal bleeding, decision to move forward with delivery was made.  Discussed R/B/A of primary c/s discussed with the patient.  The patient voiced understanding and signed the consent.    Patient is on call to the OR.    Dr. Ella waterman. PGY3 Note    At this time, due to abnormal fetal heart rate tracing and persistent vaginal bleeding, decision to move forward with delivery was made.  Discussed R/B/A of primary c/s discussed with the patient.  The patient voiced understanding and signed the consent.    Patient is on call to the OR.    Dr. Jaramillo aware.    attending  I was physically present for the key portions of the evaluation and management (e/m) service provided. I agree with the above history,physical and plan which I have reviewed and edited where appropriate  risks as written in previous note explained to pt

## 2022-03-07 NOTE — OB RN TRIAGE NOTE - CHIEF COMPLAINT QUOTE
pt transferred from [facility] via ambulance on stretcher,  pt c/o vaginal bleeding that occurred at 11:40pm while pt was at home.... pt denies cramping or rupture of membranes, pt accompanied by spouse and pt placed in triage EXAM A and placed on monitors to auscultate FHR, Dr Bush made aware of pt's arrival to triage and came into triage with sonogram, FHR audible at 140bpm via sonogram

## 2022-03-07 NOTE — OB RN PATIENT PROFILE - NS_ARRIVALINFOCOMMENT_OBGYN_ALL_OB_FT
pt transferred from [facility] via ambulance and stretcher from [facility] ED

## 2022-03-07 NOTE — OB RN DELIVERY SUMMARY - NSSELHIDDEN_OBGYN_ALL_OB_FT
[NS_DeliveryAttending1_OBGYN_ALL_OB_FT:[email or web address]=],[NS_DeliveryAssist1_OBGYN_ALL_OB_FT:[id number]],[NS_DeliveryRN_OBGYN_ALL_OB_FT:[email or web address]=],[NS_CirculateRN2_OBGYN_ALL_OB_FT:[email or web address]=]

## 2022-03-07 NOTE — OB RN TRIAGE NOTE - FALL HARM RISK - UNIVERSAL INTERVENTIONS
Bed in lowest position, wheels locked, appropriate side rails in place/Call bell, personal items and telephone in reach/Instruct patient to call for assistance before getting out of bed or chair/Non-slip footwear when patient is out of bed/[place] to call system/Physically safe environment - no spills, clutter or unnecessary equipment/Purposeful Proactive Rounding/Room/bathroom lighting operational, light cord in reach

## 2022-03-07 NOTE — PATIENT PROFILE ADULT - FALL HARM RISK - UNIVERSAL INTERVENTIONS
Bed in lowest position, wheels locked, appropriate side rails in place/Call bell, personal items and telephone in reach/Instruct patient to call for assistance before getting out of bed or chair/Non-slip footwear when patient is out of bed/[place] to call system/Physically safe environment - no spills, clutter or unnecessary equipment/Purposeful Proactive Rounding/Room/bathroom lighting operational, light cord in reach

## 2022-03-07 NOTE — OB PROVIDER H&P - HISTORY OF PRESENT ILLNESS
31y/o  at 24w0d dated by early sonogram with ITALIA 22, presents for an episode of bleeding at 11:40pm. She states she got up to use the bathroom and immediately felt a gush of red blood. She denies ctx, lof.She Feels good fetal movements. Pregnancy has been complicated by subchorionic hematoma, last bleeding episode around 15 weeks. She states she also had a normal amniocentesis this pregnancy, she states she had the amniocentesis for potential abnormalities with her placenta. She follows with Dr. Bedolla at [facility] for ob care. She has been on aspirin this pregnancy, last dose at 10pm last evening. No other complications this pregnancy. GBS unknown. Denies fevers, chills, SOB, CP, abdominal pain, nausea, vomiting, diarrhea, constipation, dysuria, urinary urgency or urinary frequency.    Last [place]: months ago  BM: yesterday 9pm  Last ate: 193 ([employer])

## 2022-03-08 ENCOUNTER — TRANSCRIPTION ENCOUNTER (OUTPATIENT)
Age: 32
End: 2022-03-08

## 2022-03-08 LAB
BASOPHILS # BLD AUTO: 0.04 K/UL — SIGNIFICANT CHANGE UP (ref 0–0.2)
BASOPHILS NFR BLD AUTO: 0.2 % — SIGNIFICANT CHANGE UP (ref 0–1)
CULTURE RESULTS: SIGNIFICANT CHANGE UP
EOSINOPHIL # BLD AUTO: 0.01 K/UL — SIGNIFICANT CHANGE UP (ref 0–0.7)
EOSINOPHIL NFR BLD AUTO: 0 % — SIGNIFICANT CHANGE UP (ref 0–8)
HCT VFR BLD CALC: 28.7 % — LOW (ref 37–47)
HGB BLD-MCNC: 9.6 G/DL — LOW (ref 12–16)
IMM GRANULOCYTES NFR BLD AUTO: 1 % — HIGH (ref 0.1–0.3)
LYMPHOCYTES # BLD AUTO: 1.95 K/UL — SIGNIFICANT CHANGE UP (ref 1.2–3.4)
LYMPHOCYTES # BLD AUTO: 8.5 % — LOW (ref 20.5–51.1)
MCHC RBC-ENTMCNC: 28.9 PG — SIGNIFICANT CHANGE UP (ref 27–31)
MCHC RBC-ENTMCNC: 33.4 G/DL — SIGNIFICANT CHANGE UP (ref 32–37)
MCV RBC AUTO: 86.4 FL — SIGNIFICANT CHANGE UP (ref 81–99)
MONOCYTES # BLD AUTO: 0.95 K/UL — HIGH (ref 0.1–0.6)
MONOCYTES NFR BLD AUTO: 4.1 % — SIGNIFICANT CHANGE UP (ref 1.7–9.3)
NEUTROPHILS # BLD AUTO: 19.85 K/UL — HIGH (ref 1.4–6.5)
NEUTROPHILS NFR BLD AUTO: 86.2 % — HIGH (ref 42.2–75.2)
NRBC # BLD: 0 /100 WBCS — SIGNIFICANT CHANGE UP (ref 0–0)
PLATELET # BLD AUTO: 356 K/UL — SIGNIFICANT CHANGE UP (ref 130–400)
RBC # BLD: 3.32 M/UL — LOW (ref 4.2–5.4)
RBC # FLD: 15.3 % — HIGH (ref 11.5–14.5)
SPECIMEN SOURCE: SIGNIFICANT CHANGE UP
WBC # BLD: 23.02 K/UL — HIGH (ref 4.8–10.8)
WBC # FLD AUTO: 23.02 K/UL — HIGH (ref 4.8–10.8)

## 2022-03-08 PROCEDURE — 99231 SBSQ HOSP IP/OBS SF/LOW 25: CPT

## 2022-03-08 RX ORDER — SIMETHICONE 80 MG/1
1 TABLET, CHEWABLE ORAL
Qty: 0 | Refills: 0 | DISCHARGE
Start: 2022-03-08

## 2022-03-08 RX ORDER — ACETAMINOPHEN 500 MG
3 TABLET ORAL
Qty: 0 | Refills: 0 | DISCHARGE
Start: 2022-03-08

## 2022-03-08 RX ORDER — IBUPROFEN 200 MG
1 TABLET ORAL
Qty: 0 | Refills: 0 | DISCHARGE
Start: 2022-03-08

## 2022-03-08 RX ORDER — SENNA PLUS 8.6 MG/1
2 TABLET ORAL
Qty: 0 | Refills: 0 | DISCHARGE
Start: 2022-03-08

## 2022-03-08 RX ADMIN — SIMETHICONE 80 MILLIGRAM(S): 80 TABLET, CHEWABLE ORAL at 11:27

## 2022-03-08 RX ADMIN — Medication 975 MILLIGRAM(S): at 22:07

## 2022-03-08 RX ADMIN — Medication 975 MILLIGRAM(S): at 02:16

## 2022-03-08 RX ADMIN — Medication 600 MILLIGRAM(S): at 00:33

## 2022-03-08 RX ADMIN — SIMETHICONE 80 MILLIGRAM(S): 80 TABLET, CHEWABLE ORAL at 05:59

## 2022-03-08 RX ADMIN — Medication 600 MILLIGRAM(S): at 12:27

## 2022-03-08 RX ADMIN — SIMETHICONE 80 MILLIGRAM(S): 80 TABLET, CHEWABLE ORAL at 02:16

## 2022-03-08 RX ADMIN — SIMETHICONE 80 MILLIGRAM(S): 80 TABLET, CHEWABLE ORAL at 22:08

## 2022-03-08 RX ADMIN — SIMETHICONE 80 MILLIGRAM(S): 80 TABLET, CHEWABLE ORAL at 17:17

## 2022-03-08 RX ADMIN — Medication 600 MILLIGRAM(S): at 06:00

## 2022-03-08 RX ADMIN — Medication 600 MILLIGRAM(S): at 11:27

## 2022-03-08 RX ADMIN — ENOXAPARIN SODIUM 40 MILLIGRAM(S): 100 INJECTION SUBCUTANEOUS at 17:18

## 2022-03-08 RX ADMIN — Medication 600 MILLIGRAM(S): at 17:17

## 2022-03-08 NOTE — PROGRESS NOTE ADULT - SUBJECTIVE AND OBJECTIVE BOX
LATASHA REDDING  32y  Female    PGY3 Note:  Patient seen and examined bedside. No overnight events. Denies heavy vaginal bleeding. Pain well controlled. Ambulating without difficulty. Tolerating regular diet, voiding, passing flatus, no BM.    Physical Exam  Vital Signs Last 24 Hrs  T(C): 36.2 (07 Mar 2022 23:55), Max: 36.9 (07 Mar 2022 07:18)  T(F): 97.1 (07 Mar 2022 23:55), Max: 98.4 (07 Mar 2022 07:18)  HR: 83 (07 Mar 2022 23:55) (72 - 97)  BP: 94/52 (07 Mar 2022 23:55) (94/52 - 139/65)  RR: 18 (07 Mar 2022 23:55) (18 - 18)  SpO2: 98% (07 Mar 2022 08:38) (96% - 99%)    Gen: NAD, sitting comfortably  CV: RRR. No murmurs gallops or rubs.  Pulm: CTAB. No wheezes or rales.  Ext: No calf tenderness, no swelling.   Abd: Nondistended, soft, nontender, BS+, fundus firm, and below umbilicus.   Lochia: Minimal rubra  Wound: Dressing changed. Pfannenstiel skin incision clean, dry intact. Steris in place. No surrounding edema or erythema.    Diet: regular    Labs:                        10.3   25.94 )-----------( 355      ( 07 Mar 2022 16:49 )             29.5                         11.1   16.86 )-----------( 365      ( 07 Mar 2022 04:01 )             32.8        UDS neg  HBsAg NR  RPR NR  Measles immune  rubella immune  HIV NR  GC/CT neg  AB pos, antibody neg  COVID-19 neg    MEDICATIONS  (STANDING):  acetaminophen     Tablet .. 975 milliGRAM(s) Oral <User Schedule>  ceFAZolin   IVPB 2000 milliGRAM(s) IV Intermittent once  diphtheria/tetanus/pertussis (acellular) Vaccine (ADAcel) 0.5 milliLiter(s) IntraMuscular once  enoxaparin Injectable 40 milliGRAM(s) SubCutaneous every 24 hours  ibuprofen  Tablet. 600 milliGRAM(s) Oral every 6 hours  influenza   Vaccine 0.5 milliLiter(s) IntraMuscular once  lactated ringers. 1000 milliLiter(s) (125 mL/Hr) IV Continuous <Continuous>  oxytocin Infusion 333.333 milliUNIT(s)/Min (1000 mL/Hr) IV Continuous <Continuous>  senna 2 Tablet(s) Oral at bedtime  simethicone 80 milliGRAM(s) Chew every 4 hours    MEDICATIONS  (PRN):  diphenhydrAMINE 25 milliGRAM(s) Oral every 6 hours PRN Pruritus  lanolin Ointment 1 Application(s) Topical every 6 hours PRN Sore Nipples  magnesium hydroxide Suspension 30 milliLiter(s) Oral two times a day PRN Constipation  oxyCODONE    IR 5 milliGRAM(s) Oral every 3 hours PRN Moderate to Severe Pain (4-10)  oxyCODONE    IR 5 milliGRAM(s) Oral once PRN Moderate to Severe Pain (4-10)

## 2022-03-08 NOTE — DISCHARGE NOTE OB - PATIENT PORTAL LINK FT
You can access the FollowMyHealth Patient Portal offered by [facility] by registering at the following website: [email or web address]. By joining [employer]’s FollowMyHealth portal, you will also be able to view your health information using other applications (apps) compatible with our system.

## 2022-03-08 NOTE — DISCHARGE NOTE OB - NS MD DC FALL RISK RISK
For information on Fall & Injury Prevention, visit: [email or web address] OR  [email or web address] OR  https://www.cdc.gov/steadi/patient.html

## 2022-03-08 NOTE — DISCHARGE NOTE OB - PLAN OF CARE
Nothing in the vagina for 6 weeks (no sex, no tampons, no douching). Avoid tub baths, you may shower.  If you have a fever of 100.4F or greater, severe vaginal bleeding, or severe abdominal pain, call your Ob/Gyn or come to the emergency department immediately.  Please follow up with your provider in 1 week for incision check, 6 weeks for postpartum visit.    I would recommend following up on 3/15 in the afternoon for your postpartum visit.   The address is [address] [place] [address].  The phone number is [phone]. H/H stable

## 2022-03-08 NOTE — DISCHARGE NOTE OB - USE THE FOOD PYRAMID (LOCATED IN DISCHARGE MATERIALS PROVIDED) AS A GUIDE TO BALANCE AND MODERATION
Child and Adolescent Inpatient Program  Interdisciplinary Treatment Plan    Jose Foley  MRN:[id number]   :2018    Plan Type: Updated Plan    Your patient, Jose Foley was seen in the Child and Adolescent Inpatient Program.  Please see below for the patient and parent/guardian agreed upon plan, including Goal(s), Focus Indicators, Outcomes and Interventions.      Goals:  Goal #1: Patient will be able to verbalize discharge plan and how it will be implemented  Goal #2: identify 3 coping skills    Goal Type:  Goal #1 Type: Long Term Goal  Goal #2 Type: Short Term Goal    Goal Progression:  Goal #1 Progression: Outcome Met - Complete Goal  Goal #2 Progression: Outcome Met - Complete Goal    Focus Indicators:  Indicators: Impaired school functioning, Impaired social functioning, Symptoms of illness    Outcomes:  Outcomes: Patient and family will demonstrate an understanding of the Safety Plan    Interventions:  Interventions: Primary Therapist will facilitate development of a structured family plan    Patient progress towards goals: reviewed treatment progress with pt and mother.        Jordon Oakley    Statement Selected

## 2022-03-08 NOTE — DISCHARGE NOTE OB - CARE PROVIDER_API CALL
Edyta Vargas)  OBN  [address] [place] [address]  Phone: [phone]  Fax: [phone]  Scheduled Appointment: 03/15/2022

## 2022-03-08 NOTE — DISCHARGE NOTE OB - CARE PLAN
Principal Discharge DX:	 delivery delivered  Assessment and plan of treatment:	Nothing in the vagina for 6 weeks (no sex, no tampons, no douching). Avoid tub baths, you may shower.  If you have a fever of 100.4F or greater, severe vaginal bleeding, or severe abdominal pain, call your Ob/Gyn or come to the emergency department immediately.  Please follow up with your provider in 1 week for incision check, 6 weeks for postpartum visit.    I would recommend following up on 3/15 in the afternoon for your postpartum visit.   The address is [address] [place] [address].  The phone number is [phone].  Secondary Diagnosis:	Placental abruption  Assessment and plan of treatment:	H/H stable   1

## 2022-03-08 NOTE — DISCHARGE NOTE OB - HOSPITAL COURSE
Patient was admitted for vaginal bleeding at 24w0d.  Clinically, there was concern for placental abruption.  NST was cat II with recurrent late decelerations.  Patient was therefore delivered via primary  section.  Postoperatively, WBC was elevated to 25 and resolved spontaneously.  No evidence of postoperative infection.  Discharged home in stable condition on POD#1.

## 2022-03-09 ENCOUNTER — TRANSCRIPTION ENCOUNTER (OUTPATIENT)
Age: 32
End: 2022-03-09

## 2022-03-09 VITALS
RESPIRATION RATE: 18 BRPM | TEMPERATURE: 97 F | SYSTOLIC BLOOD PRESSURE: 108 MMHG | DIASTOLIC BLOOD PRESSURE: 55 MMHG | HEART RATE: 82 BPM

## 2022-03-09 LAB
BASOPHILS # BLD AUTO: 0.08 K/UL — SIGNIFICANT CHANGE UP (ref 0–0.2)
BASOPHILS NFR BLD AUTO: 0.5 % — SIGNIFICANT CHANGE UP (ref 0–1)
EOSINOPHIL # BLD AUTO: 0.1 K/UL — SIGNIFICANT CHANGE UP (ref 0–0.7)
EOSINOPHIL NFR BLD AUTO: 0.6 % — SIGNIFICANT CHANGE UP (ref 0–8)
HCT VFR BLD CALC: 28.7 % — LOW (ref 37–47)
HGB BLD-MCNC: 9.4 G/DL — LOW (ref 12–16)
IMM GRANULOCYTES NFR BLD AUTO: 1.3 % — HIGH (ref 0.1–0.3)
LYMPHOCYTES # BLD AUTO: 17.4 % — LOW (ref 20.5–51.1)
LYMPHOCYTES # BLD AUTO: 2.91 K/UL — SIGNIFICANT CHANGE UP (ref 1.2–3.4)
MCHC RBC-ENTMCNC: 28.7 PG — SIGNIFICANT CHANGE UP (ref 27–31)
MCHC RBC-ENTMCNC: 32.8 G/DL — SIGNIFICANT CHANGE UP (ref 32–37)
MCV RBC AUTO: 87.5 FL — SIGNIFICANT CHANGE UP (ref 81–99)
MONOCYTES # BLD AUTO: 0.89 K/UL — HIGH (ref 0.1–0.6)
MONOCYTES NFR BLD AUTO: 5.3 % — SIGNIFICANT CHANGE UP (ref 1.7–9.3)
NEUTROPHILS # BLD AUTO: 12.58 K/UL — HIGH (ref 1.4–6.5)
NEUTROPHILS NFR BLD AUTO: 74.9 % — SIGNIFICANT CHANGE UP (ref 42.2–75.2)
NRBC # BLD: 0 /100 WBCS — SIGNIFICANT CHANGE UP (ref 0–0)
PLATELET # BLD AUTO: 387 K/UL — SIGNIFICANT CHANGE UP (ref 130–400)
RBC # BLD: 3.28 M/UL — LOW (ref 4.2–5.4)
RBC # FLD: 15.4 % — HIGH (ref 11.5–14.5)
WBC # BLD: 16.77 K/UL — HIGH (ref 4.8–10.8)
WBC # FLD AUTO: 16.77 K/UL — HIGH (ref 4.8–10.8)

## 2022-03-09 PROCEDURE — 99238 HOSP IP/OBS DSCHRG MGMT 30/<: CPT

## 2022-03-09 RX ORDER — IBUPROFEN 200 MG
1 TABLET ORAL
Qty: 28 | Refills: 0
Start: 2022-03-09 | End: 2022-03-15

## 2022-03-09 RX ORDER — SENNA PLUS 8.6 MG/1
2 TABLET ORAL
Qty: 10 | Refills: 0
Start: 2022-03-09 | End: 2022-03-13

## 2022-03-09 RX ORDER — SIMETHICONE 80 MG/1
1 TABLET, CHEWABLE ORAL
Qty: 20 | Refills: 0
Start: 2022-03-09 | End: 2022-03-13

## 2022-03-09 RX ORDER — ACETAMINOPHEN 500 MG
2 TABLET ORAL
Qty: 56 | Refills: 0
Start: 2022-03-09 | End: 2022-03-15

## 2022-03-09 NOTE — DISCHARGE NOTE NURSING/CASE MANAGEMENT/SOCIAL WORK - PATIENT PORTAL LINK FT
You can access the FollowMyHealth Patient Portal offered by [facility] by registering at the following website: [email or web address]. By joining [employer]’s FollowMyHealth portal, you will also be able to view your health information using other applications (apps) compatible with our system.

## 2022-03-09 NOTE — DISCHARGE NOTE NURSING/CASE MANAGEMENT/SOCIAL WORK - NSDCPEFALRISK_GEN_ALL_CORE
For information on Fall & Injury Prevention, visit: [email or web address] OR  [email or web address] OR  https://www.cdc.gov/steadi/patient.html

## 2022-03-09 NOTE — PROGRESS NOTE ADULT - ASSESSMENT
32y s/p primary LTCS for category II fetal tracing with placental abruption at 24wGA with subsequent  demise, [id number], clinically recovering well,   -  Pain control per routine  -  encourage ambulation  -  encourage incentive spirometry  -  PO hydration  -  Continue diet as tolerated   -  spicer in situ, f/u UO  -  DVT prophylaxis: ambulation, SCDs, Lovenox  -  social work consult for fetal demise  -  plan to d/c on POD#4 per MFM    Dr. Moreno and Dr. Ornelas to be aware
A/P: 31yo  s/p primary LTCS for category II fetal tracing with placental abruption at 24wGA with subsequent  demise, [id number], POD#2, with mild leukocytosis, otherwise clinically recovering well.    -ambulation encouraged  -PO hydration encouraged  -regular diet  -lovenox ordered for DVT prophylaxis  -Incentive Spirometry encouraged  -pain management per routine  -f/u AM CBC   -anticipate d/c home is today if AM WBC normal  -postop precautions discussed  -instructed to follow up in 1 week for incision check, and 6 weeks for postpartum check    Dr. Jaramillo to be made aware.
A/P: 33yo  s/p primary LTCS for category II fetal tracing with placental abruption at 24wGA with subsequent  demise, [id number], POD#1, with mild leukocytosis, otherwise clinically recovering well.    -ambulation encouraged  -PO hydration encouraged  -regular diet  -lovenox ordered for DVT prophylaxis  -Incentive Spirometry encouraged  -pain management per routine  -f/u AM CBC   -anticipate d/c home is today if AM WBC normal  -postop precautions discussed  -instructed to follow up in 1 week for incision check, and 6 weeks for postpartum check    Dr. Martinez to be made aware.

## 2022-03-09 NOTE — PROGRESS NOTE ADULT - SUBJECTIVE AND OBJECTIVE BOX
LATASHA REDDING  32y  Female    PGY3 Note:  Patient seen and examined bedside. No overnight events. Denies heavy vaginal bleeding. Pain well controlled. Ambulating without difficulty. Tolerating regular diet, voiding, passing flatus, no BM.    Physical Exam    Gen: NAD, sitting comfortably  CV: RRR. No murmurs gallops or rubs.  Pulm: CTAB. No wheezes or rales.  Ext: No calf tenderness, no swelling.   Abd: Nondistended, soft, nontender, BS+, fundus firm, and below umbilicus.   Lochia: Minimal rubra  Wound:  Pfannenstiel skin incision clean, dry intact. Steris in place. No surrounding edema or erythema.    Diet: regular    Labs:    3/8 0430 23.02>9.6/18.7<356, Ucx: neg                        10.3   25.94 )-----------( 355      ( 07 Mar 2022 16:49 )             29.5                         11.1   16.86 )-----------( 365      ( 07 Mar 2022 04:01 )             32.8        UDS neg  HBsAg NR  RPR NR  Measles immune  rubella immune  HIV NR  GC/CT neg  AB pos, antibody neg  COVID-19 neg    MEDICATIONS  (STANDING):  acetaminophen     Tablet .. 975 milliGRAM(s) Oral <User Schedule>  ceFAZolin   IVPB 2000 milliGRAM(s) IV Intermittent once  diphtheria/tetanus/pertussis (acellular) Vaccine (ADAcel) 0.5 milliLiter(s) IntraMuscular once  enoxaparin Injectable 40 milliGRAM(s) SubCutaneous every 24 hours  ibuprofen  Tablet. 600 milliGRAM(s) Oral every 6 hours  influenza   Vaccine 0.5 milliLiter(s) IntraMuscular once  lactated ringers. 1000 milliLiter(s) (125 mL/Hr) IV Continuous <Continuous>  oxytocin Infusion 333.333 milliUNIT(s)/Min (1000 mL/Hr) IV Continuous <Continuous>  senna 2 Tablet(s) Oral at bedtime  simethicone 80 milliGRAM(s) Chew every 4 hours    MEDICATIONS  (PRN):  diphenhydrAMINE 25 milliGRAM(s) Oral every 6 hours PRN Pruritus  lanolin Ointment 1 Application(s) Topical every 6 hours PRN Sore Nipples  magnesium hydroxide Suspension 30 milliLiter(s) Oral two times a day PRN Constipation  oxyCODONE    IR 5 milliGRAM(s) Oral every 3 hours PRN Moderate to Severe Pain (4-10)  oxyCODONE    IR 5 milliGRAM(s) Oral once PRN Moderate to Severe Pain (4-10)         LATASHA REDDING  32y  Female    PGY3 Note:  Patient seen and examined bedside. No overnight events. Denies heavy vaginal bleeding. Pain well controlled. Ambulating without difficulty. Tolerating regular diet, voiding, passing flatus.  She had a bowel movement yesterday.    Physical Exam  Vital Signs Last 24 Hrs  T(C): 36.4 (09 Mar 2022 00:18), Max: 36.7 (08 Mar 2022 15:51)  T(F): 97.6 (09 Mar 2022 00:18), Max: 98 (08 Mar 2022 15:51)  HR: 93 (09 Mar 2022 00:18) (80 - 94)  BP: 123/63 (09 Mar 2022 00:18) (103/57 - 123/63)  BP(mean): --  RR: 18 (09 Mar 2022 00:18) (18 - 18)  SpO2: --    Gen: NAD, sitting comfortably  CV: RRR. No murmurs gallops or rubs.  Pulm: CTAB. No wheezes or rales.  Ext: No calf tenderness, no swelling.   Abd: Nondistended, soft, nontender, BS+, fundus firm, and below umbilicus.   Lochia: Minimal rubra  Wound:  Pfannenstiel skin incision clean, dry intact. Steris in place. No surrounding edema or erythema.    Diet: regular    Labs:    3/8 0430 23.02>9.6/18.7<356, Ucx: neg                        10.3   25.94 )-----------( 355      ( 07 Mar 2022 16:49 )             29.5                         11.1   16.86 )-----------( 365      ( 07 Mar 2022 04:01 )             32.8        UDS neg  HBsAg NR  RPR NR  Measles immune  rubella immune  HIV NR  GC/CT neg  AB pos, antibody neg  COVID-19 neg    MEDICATIONS  (STANDING):  acetaminophen     Tablet .. 975 milliGRAM(s) Oral <User Schedule>  ceFAZolin   IVPB 2000 milliGRAM(s) IV Intermittent once  diphtheria/tetanus/pertussis (acellular) Vaccine (ADAcel) 0.5 milliLiter(s) IntraMuscular once  enoxaparin Injectable 40 milliGRAM(s) SubCutaneous every 24 hours  ibuprofen  Tablet. 600 milliGRAM(s) Oral every 6 hours  influenza   Vaccine 0.5 milliLiter(s) IntraMuscular once  lactated ringers. 1000 milliLiter(s) (125 mL/Hr) IV Continuous <Continuous>  oxytocin Infusion 333.333 milliUNIT(s)/Min (1000 mL/Hr) IV Continuous <Continuous>  senna 2 Tablet(s) Oral at bedtime  simethicone 80 milliGRAM(s) Chew every 4 hours    MEDICATIONS  (PRN):  diphenhydrAMINE 25 milliGRAM(s) Oral every 6 hours PRN Pruritus  lanolin Ointment 1 Application(s) Topical every 6 hours PRN Sore Nipples  magnesium hydroxide Suspension 30 milliLiter(s) Oral two times a day PRN Constipation  oxyCODONE    IR 5 milliGRAM(s) Oral every 3 hours PRN Moderate to Severe Pain (4-10)  oxyCODONE    IR 5 milliGRAM(s) Oral once PRN Moderate to Severe Pain (4-10)

## 2022-03-09 NOTE — PROGRESS NOTE ADULT - ATTENDING COMMENTS
Case reviewed with pt  all questions answered  no complaints  will follow wbc
pod#2  doing well  wbc decreased  discharge instructions

## 2022-03-10 LAB
FIBRINOGEN AG PPP IA-MCNC: 604 MG/DL — HIGH (ref 180–350)
SURGICAL PATHOLOGY STUDY: SIGNIFICANT CHANGE UP

## 2022-03-11 DIAGNOSIS — O45.92 PREMATURE SEPARATION OF PLACENTA, UNSPECIFIED, SECOND TRIMESTER: ICD-10-CM

## 2022-03-11 DIAGNOSIS — Z3A.24 24 WEEKS GESTATION OF PREGNANCY: ICD-10-CM

## 2022-03-11 DIAGNOSIS — Z14.1 CYSTIC FIBROSIS CARRIER: ICD-10-CM

## 2022-03-11 DIAGNOSIS — Z37.1 SINGLE STILLBIRTH: ICD-10-CM

## 2022-03-11 DIAGNOSIS — Z20.822 CONTACT WITH AND (SUSPECTED) EXPOSURE TO COVID-19: ICD-10-CM

## 2022-03-14 PROBLEM — Z00.00 ENCOUNTER FOR PREVENTIVE HEALTH EXAMINATION: Status: ACTIVE | Noted: 2022-03-14

## 2022-03-16 ENCOUNTER — TRANSCRIPTION ENCOUNTER (OUTPATIENT)
Age: 32
End: 2022-03-16

## 2022-03-16 ENCOUNTER — APPOINTMENT (OUTPATIENT)
Dept: OBGYN | Facility: CLINIC | Age: 32
End: 2022-03-16
Payer: COMMERCIAL

## 2022-03-16 ENCOUNTER — OUTPATIENT (OUTPATIENT)
Dept: OUTPATIENT SERVICES | Facility: HOSPITAL | Age: 32
LOS: 1 days | Discharge: HOME | End: 2022-03-16

## 2022-03-16 VITALS
HEIGHT: 64 IN | SYSTOLIC BLOOD PRESSURE: 110 MMHG | BODY MASS INDEX: 36.37 KG/M2 | DIASTOLIC BLOOD PRESSURE: 78 MMHG | WEIGHT: 213 LBS

## 2022-03-16 NOTE — HISTORY OF PRESENT ILLNESS
[Postpartum Follow Up] : postpartum follow up [Complications:___] : complications include: [unfilled] [Primary C/S] : delivered by  section [Breastfeeding] : not currently nursing [None] : No associated symptoms are reported [Clean/Dry/Intact] : clean, dry and intact [Erythema] : not erythematous [Swelling] : not swollen [Dehiscence] : not dehisced [de-identified] : pathology reviewed infarcts and abruption, discussed waiting 18 months for next delivery, possible asa/lovenox. all questions answered, f/u 5 weeks

## 2022-04-07 ENCOUNTER — NON-APPOINTMENT (OUTPATIENT)
Age: 32
End: 2022-04-07

## 2022-04-07 ENCOUNTER — OUTPATIENT (OUTPATIENT)
Dept: OUTPATIENT SERVICES | Facility: HOSPITAL | Age: 32
LOS: 1 days | Discharge: HOME | End: 2022-04-07

## 2022-04-07 ENCOUNTER — APPOINTMENT (OUTPATIENT)
Dept: OBGYN | Facility: CLINIC | Age: 32
End: 2022-04-07
Payer: COMMERCIAL

## 2022-04-07 VITALS
WEIGHT: 204 LBS | SYSTOLIC BLOOD PRESSURE: 104 MMHG | BODY MASS INDEX: 34.83 KG/M2 | DIASTOLIC BLOOD PRESSURE: 66 MMHG | HEIGHT: 64 IN

## 2022-04-07 PROCEDURE — 99213 OFFICE O/P EST LOW 20 MIN: CPT | Mod: 24

## 2022-04-08 RX ORDER — SULFAMETHOXAZOLE AND TRIMETHOPRIM 800; 160 MG/1; MG/1
800-160 TABLET ORAL TWICE DAILY
Qty: 14 | Refills: 0 | Status: ACTIVE | COMMUNITY
Start: 2022-04-08 | End: 1900-01-01

## 2022-04-08 NOTE — PHYSICAL EXAM
[Chaperone Present] : A chaperone was present in the examining room during all aspects of the physical examination [Appropriately responsive] : appropriately responsive [Alert] : alert [No Acute Distress] : no acute distress [FreeTextEntry7] : incision on left side with superficial <1cm separation, attempted to probe but deep tissue not , no appearance of infection, no induration, non tender

## 2022-04-08 NOTE — HISTORY OF PRESENT ILLNESS
[FreeTextEntry1] : 33yo here for urgent visit with complaint of drainage and redness at left side of  incision.\par Denies fevers/chills, reports fluid appeared clear, not like pus.\par Pt underwent emergent  at 24 wks with  demise

## 2022-04-08 NOTE — DISCUSSION/SUMMARY
[FreeTextEntry1] : 33yo with superficial wound separation\par -steristrips applied to reapprox wound\par -bactrim sent for infection ppx\par -f/u in 1 wk\par -precautions reviewed

## 2022-04-27 ENCOUNTER — APPOINTMENT (OUTPATIENT)
Dept: OBGYN | Facility: CLINIC | Age: 32
End: 2022-04-27

## 2022-07-06 ENCOUNTER — NON-APPOINTMENT (OUTPATIENT)
Age: 32
End: 2022-07-06

## 2022-10-27 ENCOUNTER — EMERGENCY (EMERGENCY)
Facility: HOSPITAL | Age: 32
LOS: 0 days | Discharge: HOME | End: 2022-10-28
Attending: EMERGENCY MEDICINE | Admitting: EMERGENCY MEDICINE

## 2022-10-27 VITALS
OXYGEN SATURATION: 96 % | WEIGHT: 199.96 LBS | HEART RATE: 122 BPM | SYSTOLIC BLOOD PRESSURE: 119 MMHG | DIASTOLIC BLOOD PRESSURE: 82 MMHG | HEIGHT: 63 IN | TEMPERATURE: 100 F | RESPIRATION RATE: 20 BRPM

## 2022-10-27 DIAGNOSIS — Z20.822 CONTACT WITH AND (SUSPECTED) EXPOSURE TO COVID-19: ICD-10-CM

## 2022-10-27 DIAGNOSIS — R10.30 LOWER ABDOMINAL PAIN, UNSPECIFIED: ICD-10-CM

## 2022-10-27 DIAGNOSIS — R10.33 PERIUMBILICAL PAIN: ICD-10-CM

## 2022-10-27 DIAGNOSIS — R50.9 FEVER, UNSPECIFIED: ICD-10-CM

## 2022-10-27 DIAGNOSIS — Z98.890 OTHER SPECIFIED POSTPROCEDURAL STATES: ICD-10-CM

## 2022-10-27 LAB
ALBUMIN SERPL ELPH-MCNC: 4.1 G/DL — SIGNIFICANT CHANGE UP (ref 3.5–5.2)
ALP SERPL-CCNC: 76 U/L — SIGNIFICANT CHANGE UP (ref 30–115)
ALT FLD-CCNC: 23 U/L — SIGNIFICANT CHANGE UP (ref 0–41)
ANION GAP SERPL CALC-SCNC: 13 MMOL/L — SIGNIFICANT CHANGE UP (ref 7–14)
APTT BLD: 33.4 SEC — SIGNIFICANT CHANGE UP (ref 27–39.2)
AST SERPL-CCNC: 13 U/L — SIGNIFICANT CHANGE UP (ref 0–41)
BASOPHILS # BLD AUTO: 0.08 K/UL — SIGNIFICANT CHANGE UP (ref 0–0.2)
BASOPHILS NFR BLD AUTO: 0.6 % — SIGNIFICANT CHANGE UP (ref 0–1)
BILIRUB SERPL-MCNC: 0.6 MG/DL — SIGNIFICANT CHANGE UP (ref 0.2–1.2)
BUN SERPL-MCNC: 13 MG/DL — SIGNIFICANT CHANGE UP (ref 10–20)
CALCIUM SERPL-MCNC: 9.4 MG/DL — SIGNIFICANT CHANGE UP (ref 8.4–10.5)
CHLORIDE SERPL-SCNC: 101 MMOL/L — SIGNIFICANT CHANGE UP (ref 98–110)
CO2 SERPL-SCNC: 22 MMOL/L — SIGNIFICANT CHANGE UP (ref 17–32)
CREAT SERPL-MCNC: 0.8 MG/DL — SIGNIFICANT CHANGE UP (ref 0.7–1.5)
EGFR: 100 ML/MIN/1.73M2 — SIGNIFICANT CHANGE UP
EOSINOPHIL # BLD AUTO: 0.13 K/UL — SIGNIFICANT CHANGE UP (ref 0–0.7)
EOSINOPHIL NFR BLD AUTO: 0.9 % — SIGNIFICANT CHANGE UP (ref 0–8)
GLUCOSE SERPL-MCNC: 105 MG/DL — HIGH (ref 70–99)
HCG SERPL-ACNC: 1249 MIU/ML — HIGH
HCT VFR BLD CALC: 38.9 % — SIGNIFICANT CHANGE UP (ref 37–47)
HGB BLD-MCNC: 12.8 G/DL — SIGNIFICANT CHANGE UP (ref 12–16)
IMM GRANULOCYTES NFR BLD AUTO: 0.4 % — HIGH (ref 0.1–0.3)
INR BLD: 1.05 RATIO — SIGNIFICANT CHANGE UP (ref 0.65–1.3)
LACTATE SERPL-SCNC: 1.4 MMOL/L — SIGNIFICANT CHANGE UP (ref 0.7–2)
LIDOCAIN IGE QN: 27 U/L — SIGNIFICANT CHANGE UP (ref 7–60)
LYMPHOCYTES # BLD AUTO: 1.81 K/UL — SIGNIFICANT CHANGE UP (ref 1.2–3.4)
LYMPHOCYTES # BLD AUTO: 13.1 % — LOW (ref 20.5–51.1)
MCHC RBC-ENTMCNC: 26.3 PG — LOW (ref 27–31)
MCHC RBC-ENTMCNC: 32.9 G/DL — SIGNIFICANT CHANGE UP (ref 32–37)
MCV RBC AUTO: 79.9 FL — LOW (ref 81–99)
MONOCYTES # BLD AUTO: 0.79 K/UL — HIGH (ref 0.1–0.6)
MONOCYTES NFR BLD AUTO: 5.7 % — SIGNIFICANT CHANGE UP (ref 1.7–9.3)
NEUTROPHILS # BLD AUTO: 11 K/UL — HIGH (ref 1.4–6.5)
NEUTROPHILS NFR BLD AUTO: 79.3 % — HIGH (ref 42.2–75.2)
NRBC # BLD: 0 /100 WBCS — SIGNIFICANT CHANGE UP (ref 0–0)
PLATELET # BLD AUTO: 417 K/UL — HIGH (ref 130–400)
POTASSIUM SERPL-MCNC: 3.9 MMOL/L — SIGNIFICANT CHANGE UP (ref 3.5–5)
POTASSIUM SERPL-SCNC: 3.9 MMOL/L — SIGNIFICANT CHANGE UP (ref 3.5–5)
PROT SERPL-MCNC: 7.1 G/DL — SIGNIFICANT CHANGE UP (ref 6–8)
PROTHROM AB SERPL-ACNC: 12 SEC — SIGNIFICANT CHANGE UP (ref 9.95–12.87)
RBC # BLD: 4.87 M/UL — SIGNIFICANT CHANGE UP (ref 4.2–5.4)
RBC # FLD: 16.5 % — HIGH (ref 11.5–14.5)
SARS-COV-2 RNA SPEC QL NAA+PROBE: SIGNIFICANT CHANGE UP
SODIUM SERPL-SCNC: 136 MMOL/L — SIGNIFICANT CHANGE UP (ref 135–146)
WBC # BLD: 13.86 K/UL — HIGH (ref 4.8–10.8)
WBC # FLD AUTO: 13.86 K/UL — HIGH (ref 4.8–10.8)

## 2022-10-27 PROCEDURE — 76830 TRANSVAGINAL US NON-OB: CPT | Mod: 26

## 2022-10-27 PROCEDURE — 71045 X-RAY EXAM CHEST 1 VIEW: CPT | Mod: 26

## 2022-10-27 PROCEDURE — 99285 EMERGENCY DEPT VISIT HI MDM: CPT

## 2022-10-27 RX ORDER — KETOROLAC TROMETHAMINE 30 MG/ML
15 SYRINGE (ML) INJECTION ONCE
Refills: 0 | Status: DISCONTINUED | OUTPATIENT
Start: 2022-10-27 | End: 2022-10-27

## 2022-10-27 RX ORDER — SODIUM CHLORIDE 9 MG/ML
1000 INJECTION INTRAMUSCULAR; INTRAVENOUS; SUBCUTANEOUS ONCE
Refills: 0 | Status: COMPLETED | OUTPATIENT
Start: 2022-10-27 | End: 2022-10-27

## 2022-10-28 VITALS
OXYGEN SATURATION: 96 % | TEMPERATURE: 98 F | HEART RATE: 100 BPM | DIASTOLIC BLOOD PRESSURE: 63 MMHG | RESPIRATION RATE: 20 BRPM | SYSTOLIC BLOOD PRESSURE: 111 MMHG

## 2022-10-28 PROBLEM — Z78.9 OTHER SPECIFIED HEALTH STATUS: Chronic | Status: ACTIVE | Noted: 2022-03-07

## 2022-10-28 LAB
APPEARANCE UR: CLEAR — SIGNIFICANT CHANGE UP
BILIRUB UR-MCNC: NEGATIVE — SIGNIFICANT CHANGE UP
BLD GP AB SCN SERPL QL: SIGNIFICANT CHANGE UP
COLOR SPEC: YELLOW — SIGNIFICANT CHANGE UP
DIFF PNL FLD: ABNORMAL
EPI CELLS # UR: ABNORMAL /HPF
GLUCOSE UR QL: NEGATIVE MG/DL — SIGNIFICANT CHANGE UP
KETONES UR-MCNC: NEGATIVE — SIGNIFICANT CHANGE UP
LEUKOCYTE ESTERASE UR-ACNC: NEGATIVE — SIGNIFICANT CHANGE UP
NITRITE UR-MCNC: NEGATIVE — SIGNIFICANT CHANGE UP
PH UR: 6 — SIGNIFICANT CHANGE UP (ref 5–8)
PROT UR-MCNC: NEGATIVE MG/DL — SIGNIFICANT CHANGE UP
RBC CASTS # UR COMP ASSIST: ABNORMAL /HPF
SP GR SPEC: 1.01 — SIGNIFICANT CHANGE UP (ref 1.01–1.03)
UROBILINOGEN FLD QL: 0.2 MG/DL — SIGNIFICANT CHANGE UP
WBC UR QL: SIGNIFICANT CHANGE UP /HPF

## 2022-10-28 PROCEDURE — 74177 CT ABD & PELVIS W/CONTRAST: CPT | Mod: 26,MA

## 2022-10-28 RX ADMIN — SODIUM CHLORIDE 2000 MILLILITER(S): 9 INJECTION INTRAMUSCULAR; INTRAVENOUS; SUBCUTANEOUS at 01:11

## 2022-10-28 RX ADMIN — Medication 100 MILLIGRAM(S): at 01:28

## 2022-10-28 NOTE — ED PROVIDER NOTE - PATIENT PORTAL LINK FT
You can access the FollowMyHealth Patient Portal offered by [facility] by registering at the following website: [email or web address]. By joining [employer]’s FollowMyHealth portal, you will also be able to view your health information using other applications (apps) compatible with our system.

## 2022-10-28 NOTE — ED PROVIDER NOTE - PROGRESS NOTE DETAILS
Sammi: on reassessment, patient afebrile and pain free, soft, NT, ND abdomen, no lochia, heavy bleeding on pelvic exam -- US with possible retained products -- discussed case with Dr. Paz, does not feel that this represents RPOC given lack of bleeding and based on US -- recommends oral doxy as patient not on abx from dc and follow up in office, either with her gyn or with him in AM.    Discussed this with the patient and her , patient states she feels much better than on arrival and would prefer outpatient follow up to transfer if it is possible/safe.

## 2022-10-28 NOTE — ED PROVIDER NOTE - NS ED ROS FT
Constitutional: + fever, chills, no recent weight loss, change in appetite or malaise  Eyes: no redness/discharge/pain/vision changes  ENT: no rhinorrhea/ear pain/sore throat  Cardiac: No chest pain, SOB or edema.  Respiratory: No cough or respiratory distress  GI: See HPI  : No dysuria, frequency, urgency or hematuria  MS: no pain to back or extremities, no loss of ROM, no weakness  Neuro: No headache or weakness. No LOC.  Skin: No skin rash.  Endocrine: No history of thyroid disease or diabetes.

## 2022-10-28 NOTE — ED PROVIDER NOTE - NSFOLLOWUPINSTRUCTIONS_ED_ALL_ED_FT
FOLLOW UP WITH YOUR GYN OR THE DOCTOR     Fever, Adult        A person using an oral thermometer.       A person holding a temporal thermometer to another person's forehead.     A fever is an increase in the body's temperature. It is usually defined as a temperature of 100.4°F (38°C) or higher. Brief mild or moderate fevers generally have no long-term effects, and they often do not need treatment. Moderate or high fevers may make you feel uncomfortable and can sometimes be a sign of a serious illness or disease. The sweating that may occur with repeated or prolonged fever may also cause a loss of fluid in the body (dehydration).    Fever is confirmed by taking a temperature with a thermometer. A measured temperature can vary with:  •Age.      •Time of day.    •Where in the body you take the temperature. Readings may vary if you place the thermometer:  •In the mouth (oral).      •In the rectum (rectal).      •In the ear (tympanic).      •Under the arm (axillary).      •On the forehead (temporal).          Follow these instructions at home:    Medicines     •Take over-the counter and prescription medicines only as told by your health care provider. Follow the dosing instructions carefully.      •If you were prescribed an antibiotic medicine, take it as told by your health care provider. Do not stop taking the antibiotic even if you start to feel better.      General instructions     •Watch your condition for any changes. Let your health care provider know about them.      •Rest as needed.      •Drink enough fluid to keep your urine pale yellow. This helps to prevent dehydration.      •Sponge yourself or bathe with room-temperature water to help reduce your body temperature as needed. Do not use ice water.      • Do not use too many blankets or wear clothes that are too heavy.      •If your fever may be caused by an infection that spreads from person to person (is contagious), such as a cold or the flu, you should stay home from work and public gatherings for at least 24 hours after your fever is gone. Your fever should be gone without the need to use medicines.        Contact a health care provider if:    •You vomit.      •You cannot eat or drink without vomiting.      •You have diarrhea.      •You have pain when you urinate.      •Your symptoms do not improve with treatment.      •You develop new symptoms.      •You develop excessive weakness.        Get help right away if:    •You have shortness of breath or have trouble breathing.      •You are dizzy or you faint.      •You are disoriented or confused.    •You develop signs of dehydration, such as:  •Dark urine, very little urine, or no urine.      •Cracked lips.      •Dry mouth.      •Sunken eyes.      •Sleepiness.      •Weakness.        •You develop severe pain in your abdomen.      •You have persistent vomiting or diarrhea.      •You develop a skin rash.      •Your symptoms suddenly get worse.        Summary    •A fever is an increase in the body's temperature. It is usually defined as a temperature of 100.4°F (38°C) or higher. Moderate or high fevers can sometimes be a sign of a serious illness or disease. The sweating that may occur with repeated or prolonged fever may also cause dehydration.      •Pay attention to any changes in your symptoms and contact your health care provider if your symptoms do not improve with treatment.      •Take over-the counter and prescription medicines only as told by your health care provider. Follow the dosing instructions carefully.      •If your fever is from an infection that may be contagious, such as cold or flu, you should stay home from work and public gatherings for at least 24 hours after your fever is gone. Your fever should be gone without the need to use medicines.      •Get help right away if you develop signs of dehydration, such as dark urine, cracked lips, dry mouth, sunken eyes, sleepiness, or weakness.      This information is not intended to replace advice given to you by your health care provider. Make sure you discuss any questions you have with your health care provider.      Abdominal Pain, Adult      Pain in the abdomen (abdominal pain) can be caused by many things. Often, abdominal pain is not serious and it gets better with no treatment or by being treated at home. However, sometimes abdominal pain is serious.    Your health care provider will ask questions about your medical history and do a physical exam to try to determine the cause of your abdominal pain.      Follow these instructions at home:    Medicines     •Take over-the-counter and prescription medicines only as told by your health care provider.      • Do not take a laxative unless told by your health care provider.        General instructions      •Watch your condition for any changes.      •Drink enough fluid to keep your urine pale yellow.      •Keep all follow-up visits as told by your health care provider. This is important.        Contact a health care provider if:    •Your abdominal pain changes or gets worse.      •You are not hungry or you lose weight without trying.      •You are constipated or have diarrhea for more than 2–3 days.      •You have pain when you urinate or have a bowel movement.      •Your abdominal pain wakes you up at night.      •Your pain gets worse with meals, after eating, or with certain foods.      •You are vomiting and cannot keep anything down.      •You have a fever.      •You have blood in your urine.        Get help right away if:    •Your pain does not go away as soon as your health care provider told you to expect.      •You cannot stop vomiting.      •Your pain is only in areas of the abdomen, such as the right side or the left lower portion of the abdomen. Pain on the right side could be caused by appendicitis.      •You have bloody or black stools, or stools that look like tar.      •You have severe pain, cramping, or bloating in your abdomen.    •You have signs of dehydration, such as:  •Dark urine, very little urine, or no urine.      •Cracked lips.      •Dry mouth.      •Sunken eyes.      •Sleepiness.      •Weakness.        •You have trouble breathing or chest pain.        Summary    •Often, abdominal pain is not serious and it gets better with no treatment or by being treated at home. However, sometimes abdominal pain is serious.      •Watch your condition for any changes.      •Take over-the-counter and prescription medicines only as told by your health care provider.      •Contact a health care provider if your abdominal pain changes or gets worse.      •Get help right away if you have severe pain, cramping, or bloating in your abdomen.      This information is not intended to replace advice given to you by your health care provider. Make sure you discuss any questions you have with your health care provider. FOLLOW UP WITH YOUR GYN IN THE MORNING. IF YOU ARE UNABLE TO FOLLOW UP WITH YOUR DOCTOR, PLEASE CALL AND FOLLOW UP WITH DR. CARDOZA, THE DOCTOR WHO WAS CONSULTED VIA TELEPHONE FOR YOUR CASE IN THE ER.    IF YOU ARE UNABLE TO BE ASSESSED YOU MUST RETURN TO THE ER FOR RE-EVALUATION.     Fever, Adult        A person using an oral thermometer.       A person holding a temporal thermometer to another person's forehead.     A fever is an increase in the body's temperature. It is usually defined as a temperature of 100.4°F (38°C) or higher. Brief mild or moderate fevers generally have no long-term effects, and they often do not need treatment. Moderate or high fevers may make you feel uncomfortable and can sometimes be a sign of a serious illness or disease. The sweating that may occur with repeated or prolonged fever may also cause a loss of fluid in the body (dehydration).    Fever is confirmed by taking a temperature with a thermometer. A measured temperature can vary with:  •Age.      •Time of day.    •Where in the body you take the temperature. Readings may vary if you place the thermometer:  •In the mouth (oral).      •In the rectum (rectal).      •In the ear (tympanic).      •Under the arm (axillary).      •On the forehead (temporal).          Follow these instructions at home:    Medicines     •Take over-the counter and prescription medicines only as told by your health care provider. Follow the dosing instructions carefully.      •If you were prescribed an antibiotic medicine, take it as told by your health care provider. Do not stop taking the antibiotic even if you start to feel better.      General instructions     •Watch your condition for any changes. Let your health care provider know about them.      •Rest as needed.      •Drink enough fluid to keep your urine pale yellow. This helps to prevent dehydration.      •Sponge yourself or bathe with room-temperature water to help reduce your body temperature as needed. Do not use ice water.      • Do not use too many blankets or wear clothes that are too heavy.      •If your fever may be caused by an infection that spreads from person to person (is contagious), such as a cold or the flu, you should stay home from work and public gatherings for at least 24 hours after your fever is gone. Your fever should be gone without the need to use medicines.        Contact a health care provider if:    •You vomit.      •You cannot eat or drink without vomiting.      •You have diarrhea.      •You have pain when you urinate.      •Your symptoms do not improve with treatment.      •You develop new symptoms.      •You develop excessive weakness.        Get help right away if:    •You have shortness of breath or have trouble breathing.      •You are dizzy or you faint.      •You are disoriented or confused.    •You develop signs of dehydration, such as:  •Dark urine, very little urine, or no urine.      •Cracked lips.      •Dry mouth.      •Sunken eyes.      •Sleepiness.      •Weakness.        •You develop severe pain in your abdomen.      •You have persistent vomiting or diarrhea.      •You develop a skin rash.      •Your symptoms suddenly get worse.        Summary    •A fever is an increase in the body's temperature. It is usually defined as a temperature of 100.4°F (38°C) or higher. Moderate or high fevers can sometimes be a sign of a serious illness or disease. The sweating that may occur with repeated or prolonged fever may also cause dehydration.      •Pay attention to any changes in your symptoms and contact your health care provider if your symptoms do not improve with treatment.      •Take over-the counter and prescription medicines only as told by your health care provider. Follow the dosing instructions carefully.      •If your fever is from an infection that may be contagious, such as cold or flu, you should stay home from work and public gatherings for at least 24 hours after your fever is gone. Your fever should be gone without the need to use medicines.      •Get help right away if you develop signs of dehydration, such as dark urine, cracked lips, dry mouth, sunken eyes, sleepiness, or weakness.      This information is not intended to replace advice given to you by your health care provider. Make sure you discuss any questions you have with your health care provider.      Abdominal Pain, Adult      Pain in the abdomen (abdominal pain) can be caused by many things. Often, abdominal pain is not serious and it gets better with no treatment or by being treated at home. However, sometimes abdominal pain is serious.    Your health care provider will ask questions about your medical history and do a physical exam to try to determine the cause of your abdominal pain.      Follow these instructions at home:    Medicines     •Take over-the-counter and prescription medicines only as told by your health care provider.      • Do not take a laxative unless told by your health care provider.        General instructions      •Watch your condition for any changes.      •Drink enough fluid to keep your urine pale yellow.      •Keep all follow-up visits as told by your health care provider. This is important.        Contact a health care provider if:    •Your abdominal pain changes or gets worse.      •You are not hungry or you lose weight without trying.      •You are constipated or have diarrhea for more than 2–3 days.      •You have pain when you urinate or have a bowel movement.      •Your abdominal pain wakes you up at night.      •Your pain gets worse with meals, after eating, or with certain foods.      •You are vomiting and cannot keep anything down.      •You have a fever.      •You have blood in your urine.        Get help right away if:    •Your pain does not go away as soon as your health care provider told you to expect.      •You cannot stop vomiting.      •Your pain is only in areas of the abdomen, such as the right side or the left lower portion of the abdomen. Pain on the right side could be caused by appendicitis.      •You have bloody or black stools, or stools that look like tar.      •You have severe pain, cramping, or bloating in your abdomen.    •You have signs of dehydration, such as:  •Dark urine, very little urine, or no urine.      •Cracked lips.      •Dry mouth.      •Sunken eyes.      •Sleepiness.      •Weakness.        •You have trouble breathing or chest pain.        Summary    •Often, abdominal pain is not serious and it gets better with no treatment or by being treated at home. However, sometimes abdominal pain is serious.      •Watch your condition for any changes.      •Take over-the-counter and prescription medicines only as told by your health care provider.      •Contact a health care provider if your abdominal pain changes or gets worse.      •Get help right away if you have severe pain, cramping, or bloating in your abdomen.      This information is not intended to replace advice given to you by your health care provider. Make sure you discuss any questions you have with your health care provider.

## 2022-10-28 NOTE — ED PROVIDER NOTE - PHYSICAL EXAMINATION
CONSTITUTIONAL: Well-appearing; well-nourished; in no apparent distress.   EYES: PERRL; EOM intact.   ENT: normal nose; no rhinorrhea; normal pharynx with no tonsillar hypertrophy or exudate.  CARDIOVASCULAR: tachycardia. Normal S1, S2; no murmurs, rubs, or gallops.   RESPIRATORY: Normal chest excursion with respiration; breath sounds clear and equal bilaterally; no wheezes, rhonchi, or rales.  GI: RLQ tenderness. Normal bowel sounds; non-distended; no palpable organomegaly.   MS: No evidence of trauma or deformity. Non-tender to palpation.   SKIN: Normal for age and race; warm; dry; good turgor; no apparent lesions or exudate.   NEURO/PSYCH: A & O x 4; grossly unremarkable.

## 2022-10-28 NOTE — ED PROVIDER NOTE - CARE PROVIDER_API CALL
Jose Paz)  Obstetrics and Gynecology  [address] [place] [address]  Phone: [phone]  Fax: [phone]  Follow Up Time: Urgent

## 2022-10-28 NOTE — ED PROVIDER NOTE - CLINICAL SUMMARY MEDICAL DECISION MAKING FREE TEXT BOX
31 yo F 2 days sp D&C at [facility] for fetal demise at 10 weeks, here for assessment of fever and lower abdominal pain -- pain aching, fever mild, .6, No vaginal discharge, bleeding. No nausea, vomiting, diarrhea.     Took tylenol earlier in the day.     VS notable for T 100.3 but tactile fever feels higher, . On exam is well appearing, in no distress, clear lungs, RR, soft abdomen with RLQ ttp    Concern for endometritis vs appendicitis.    labs with WBC 13, UA negative for infection, CT without acute abnormalities.    US with slightly thickened endometrium, cannot ro retained products.    Discussed case with Dr. Paz who feels that this is clinically not the case and this does not represent endometritis given lack of discharge, bleeding.    On reassessment patient has normal VS, is pain free.    Will dc with doxy and follow up per Dr. Paz's recommendations.    Patient aware that RPOC, endometritis has not fully been ruled out, must follow up, must return immediately for new or worsenting symptoms.

## 2022-10-28 NOTE — ED PROVIDER NOTE - OBJECTIVE STATEMENT
32 years old female no significant history of present complaint fever and lower abdominal pain evening.  As per patient, she had D&C for 10 weeks fetal demise 2 days ago at outside hospital.  Initially noted lower abdominal cramping and bleeding on the first day.  Reports periumbilical pain earlier today.  Started having fever this evening and contacted her GYN who recommended he come to ED for evaluations.  Patient otherwise denies headache, sore throat, body ache, coughing, vomiting diarrhea, urinary symptoms and vaginal foul-smelling discharge or bleeding.

## 2022-10-29 LAB
CULTURE RESULTS: SIGNIFICANT CHANGE UP
SPECIMEN SOURCE: SIGNIFICANT CHANGE UP

## 2022-11-04 NOTE — OB PROVIDER TRIAGE NOTE - LABOR: CERVICAL EFFACEMENT
Past Medical History:   Diagnosis Date    Anemia, unspecified     Aortic stenosis     CVA (cerebral vascular accident)     Essential (primary) hypertension     Mixed hyperlipidemia        Past Surgical History:   Procedure Laterality Date    APPENDECTOMY      CATARACT EXTRACTION      CHOLECYSTECTOMY      HYSTERECTOMY         Review of patient's allergies indicates:   Allergen Reactions    Sulfa (sulfonamide antibiotics)      Questionable in record       Current Facility-Administered Medications on File Prior to Encounter   Medication    [COMPLETED] 0.9%  NaCl infusion    [COMPLETED] iopamidoL (ISOVUE-370) injection 100 mL    [COMPLETED] pantoprazole injection 40 mg    [COMPLETED] potassium bicarbonate disintegrating tablet 25 mEq    [DISCONTINUED] 0.9%  NaCl infusion (for blood administration)    [DISCONTINUED] enoxaparin injection 50 mg     Current Outpatient Medications on File Prior to Encounter   Medication Sig    amLODIPine (NORVASC) 2.5 MG tablet Take 2.5 mg by mouth once daily.    atorvastatin (LIPITOR) 40 MG tablet Take 1 tablet by mouth once daily.    cranberry fruit concentrate 250 mg Chew Take by mouth.    multivit/folic acid/vit K1 (ONE-A-DAY WOMEN'S 50 PLUS ORAL) Take 1 tablet by mouth once daily.    olmesartan (BENICAR) 40 MG tablet Take 40 mg by mouth once daily.    [DISCONTINUED] amLODIPine (NORVASC) 5 MG tablet Take 5 mg by mouth once daily.    [DISCONTINUED] aspirin (ECOTRIN) 81 MG EC tablet Take 81 mg by mouth.    [DISCONTINUED] atorvastatin (LIPITOR) 40 MG tablet Take 40 mg by mouth once daily.     Family History       Problem Relation (Age of Onset)    Alzheimer's disease Mother    Heart attack Father          Tobacco Use    Smoking status: Never    Smokeless tobacco: Not on file   Substance and Sexual Activity    Alcohol use: Never    Drug use: Not on file    Sexual activity: Not on file     Review of Systems   Unable to perform ROS: Dementia   Constitutional:  Positive for fatigue.    Gastrointestinal:  Positive for diarrhea.   Skin:  Positive for pallor.   Neurological:  Positive for weakness.   Objective:     Vital Signs (Most Recent):  Temp: 97.8 °F (36.6 °C) (11/04/22 0524)  Pulse: 72 (11/04/22 0544)  Resp: 18 (11/04/22 0524)  BP: 137/64 (11/04/22 0524)  SpO2: 100 % (11/04/22 0524) Vital Signs (24h Range):  Temp:  [97.5 °F (36.4 °C)-98.2 °F (36.8 °C)] 97.8 °F (36.6 °C)  Pulse:  [] 72  Resp:  [12-21] 18  SpO2:  [89 %-100 %] 100 %  BP: (101-157)/(49-94) 137/64     Weight: 51.5 kg (113 lb 8.6 oz)  Body mass index is 18.33 kg/m².    Physical Exam  Vitals and nursing note reviewed.   Constitutional:       General: She is not in acute distress.     Appearance: She is well-developed. She is not diaphoretic.   HENT:      Head: Normocephalic and atraumatic.      Nose: Nose normal.   Eyes:      General: No scleral icterus.     Conjunctiva/sclera: Conjunctivae normal.   Neck:      Trachea: No tracheal deviation.   Cardiovascular:      Rate and Rhythm: Normal rate and regular rhythm.      Heart sounds: Murmur heard.     No friction rub. No gallop.   Pulmonary:      Effort: Pulmonary effort is normal. No respiratory distress.      Breath sounds: Normal breath sounds. No stridor. No wheezing or rales.   Chest:      Chest wall: No tenderness.   Abdominal:      General: Bowel sounds are normal. There is no distension.      Palpations: Abdomen is soft. There is no mass.      Tenderness: There is no abdominal tenderness. There is no guarding or rebound.   Musculoskeletal:         General: No tenderness or deformity. Normal range of motion.      Cervical back: Normal range of motion and neck supple.   Skin:     General: Skin is warm and dry.      Coloration: Skin is pale.      Findings: No erythema or rash.   Neurological:      Mental Status: She is alert. Mental status is at baseline.      Cranial Nerves: No cranial nerve deficit.      Motor: No abnormal muscle tone.      Coordination: Coordination  normal.      Comments: Oriented to person and place   Psychiatric:         Behavior: Behavior normal.         Thought Content: Thought content normal.         Cognition and Memory: Cognition is impaired.           Significant Labs: All pertinent labs within the past 24 hours have been reviewed.    Significant Imaging: I have reviewed all pertinent imaging results/findings within the past 24 hours.   0%

## 2022-12-09 NOTE — ED PROVIDER NOTE - NSCAREINITIATED _GEN_ER
December 14, 2022       TO: Kar Tanner  [address] [place] [address]       Dear Kar Tanner,    We received a refill request from [place] for your atorvastatin 80mg daily however it does not appear you have been seen in our clinic for almost 2 years. You would need an appointment with cardiology and updated labs for any refills on this medication. You can call our scheduling department at [phone] to set these up.     Alternatively, if you are no longer a [place] resident and/or will no longer be followed by our clinic then you would need to have your prescription filled by a local provider.     Thank you  [facility]   [facility]          
Deonna Chapa(Attending)

## 2022-12-27 NOTE — PATIENT PROFILE ADULT - MONEY FOR FOOD
no
Pt presents c/o diarrhea since Friday of this week.  Pt recently had a UTI w/ outpatient abx treatment.  Pt completed ourpatient abx, complicated by concern for STI treated w/ IM ceftriaxone.  Pt is on day 3 of PO doxicycline and presents w/ diarrhea, fatigue and chills.  Pt denies dysuria, discharge or vomiting.
[facility]

## 2023-01-28 ENCOUNTER — APPOINTMENT (OUTPATIENT)
Dept: ORTHOPEDIC SURGERY | Facility: CLINIC | Age: 33
End: 2023-01-28
Payer: COMMERCIAL

## 2023-01-28 DIAGNOSIS — Z78.9 OTHER SPECIFIED HEALTH STATUS: ICD-10-CM

## 2023-01-28 DIAGNOSIS — S93.492A SPRAIN OF OTHER LIGAMENT OF LEFT ANKLE, INITIAL ENCOUNTER: ICD-10-CM

## 2023-01-28 PROCEDURE — 73610 X-RAY EXAM OF ANKLE: CPT | Mod: LT

## 2023-01-28 PROCEDURE — 99203 OFFICE O/P NEW LOW 30 MIN: CPT

## 2023-01-28 PROCEDURE — 73630 X-RAY EXAM OF FOOT: CPT | Mod: LT

## 2023-01-28 NOTE — IMAGING
[de-identified] : On examination of her left ankle she has some swelling over the lateral aspect of the ankle.  No erythema, no ecchymosis.  She is mildly tender over the lateral malleolus, she is tender over the ATFL and the CFL.  She is nontender over the medial malleolus or the deltoid ligament.  She is nontender over the talar dome.  She is nontender over the Achilles, negative Nixon's test, no calf tenderness.  She has no tenderness palpation of the calcaneus, no tenderness over the metatarsals or the Lisfranc joint.  She is able to dorsiflex and plantarflex, sensation is intact throughout, 2+ DP and PT pulses.\par \par X-rays taken in the office today of the left ankle and left foot show no acute fractures, dislocations, or other bony abnormalities.

## 2023-01-28 NOTE — DISCUSSION/SUMMARY
[de-identified] : At this time I placed her in an Aircast.  She can weight-bear as tolerated in the Aircast.  Rest, ice, elevate, anti-inflammatories as needed for pain.  The patient understands that these injuries take 4-6 weeks to heal. The 1st 2 weeks are always the worst. Bruising and swelling is common for the next several weeks.  The more they are on their feet in a day, the more swollen they will be at the end of the day.  I will see her back in a few weeks for repeat evaluation if the pain does not improve. Patient will call me if any other problems or concerns.  Patient verbalized understanding and agreed with the plan, all questions were answered in the office today.\par

## 2023-01-28 NOTE — HISTORY OF PRESENT ILLNESS
[de-identified] : 32-year-old female is here today for evaluation of her left ankle.  Patient states she rolled her ankle about a week ago.  She states since then she has been having pain and swelling in the ankle.  She has some pain when she first stands up and starts walking, she states when she walks for a while she feels a bit better.  She has not been taking anything for the pain.  She denies any previous injuries to the ankle, she denies any numbness tingling or any calf pain.

## 2023-02-17 ENCOUNTER — APPOINTMENT (OUTPATIENT)
Dept: ORTHOPEDIC SURGERY | Facility: CLINIC | Age: 33
End: 2023-02-17

## 2024-02-03 NOTE — OB RN INTRAOPERATIVE NOTE - NS_UTERINECLOSURE_OBGYN_ALL_OB_DT
SUBJECTIVE:  Seen post cath during bed rest. LE neurovascular intact. No complaints.     OBJECTIVE:  I/O's    Intake/Output Summary (Last 24 hours) at 2/2/2024 2029  Last data filed at 2/2/2024 1823  Gross per 24 hour   Intake 375 ml   Output 510 ml   Net -135 ml         SCHEDULED MEDS:  Current Facility-Administered Medications   Medication Dose Route Frequency Provider Last Rate Last Admin    sodium chloride 0.9 % injection 2 mL  2 mL Intracatheter 2 times per day Chino Hill MD   2 mL at 02/02/24 1444    [START ON 2/3/2024] aspirin chewable 81 mg  81 mg Oral Daily Chino Hill MD        pravastatin (PRAVACHOL) tablet 20 mg  20 mg Oral Nightly Chino Hill MD   20 mg at 02/01/24 2059    nicotine (NICODERM) 14 MG/24HR patch 1 patch  1 patch Transdermal Daily Sean Abreu CNP   1 patch at 02/01/24 1349    sodium chloride 0.9 % injection 2 mL  2 mL Intracatheter 2 times per day Radha Del Real MD   2 mL at 02/02/24 0836    furosemide (LASIX INJECT) injection 20 mg  20 mg Intravenous BID Chino Hill MD   20 mg at 02/02/24 1843    metoPROLOL succinate (TOPROL-XL) ER tablet 25 mg  25 mg Oral Daily Chino Hill MD   25 mg at 02/02/24 0835    latanoprost (XALATAN) 0.005 % ophthalmic solution 1 drop  1 drop Both Eyes Nightly Sean Abreu CNP   1 drop at 02/01/24 2145    enoxaparin (LOVENOX) injection 30 mg  30 mg Subcutaneous Nightly Sean Abreu CNP   30 mg at 01/31/24 2010       PRN MEDS:  Current Facility-Administered Medications   Medication Dose Route Frequency Provider Last Rate Last Admin    acetaminophen (TYLENOL) tablet 650 mg  650 mg Oral Q4H PRN Chino Hill MD        Or    acetaminophen (TYLENOL) suppository 650 mg  650 mg Rectal Q4H PRN Chino Hill MD        cloNIDine (CATAPRES) tablet 0.1 mg  0.1 mg Oral Q4H PRN Chino Hill MD        hydrALAZINE (APRESOLINE) injection 10 mg  10 mg Intravenous Q4H PRN Chino Hill MD        nitroGLYCERIN (NITROSTAT) sublingual tablet  0.4 mg  0.4 mg Sublingual Q5 Min PRN Chino Hill MD        sodium chloride 0.9 % flush bag 25 mL  25 mL Intravenous PRN Chino Hill MD        sodium chloride (NORMAL SALINE) 0.9 % bolus 250 mL  250 mL Intravenous PRN Chino Hill MD        atropine injection 0.5 mg  0.5 mg Intravenous PRN Chino Hill MD        lidocaine 1 % injection 10 mg  1 mL Subcutaneous PRN Chino Hill MD        MIDazolam (VERSED) injection 1 mg  1 mg Intravenous PRN Chino Hill MD        nitroGLYCERIN (NITROSTAT) sublingual tablet 0.4 mg  0.4 mg Sublingual Q5 Min PRN Chino Hill MD        guaiFENesin (MUCINEX) ER tablet 600 mg  600 mg Oral Q12H PRN Sean Abreu CNP        sodium chloride 0.9 % flush bag 25 mL  25 mL Intravenous PRN Radha Del Real MD        ondansetron (ZOFRAN ODT) disintegrating tablet 4 mg  4 mg Oral Q12H PRN Radha Del Real MD        Or    ondansetron (ZOFRAN) injection 4 mg  4 mg Intravenous Q12H PRN Radha Del Real MD           Last Recorded Vitals  VITAL SIGNS:    Vital Last Value 24 Hour Range   Temperature 98.8 °F (37.1 °C) (02/02/24 1901) Temp  Min: 97.3 °F (36.3 °C)  Max: 98.8 °F (37.1 °C)   Pulse (!) 102 (02/02/24 1700) Pulse  Min: 85  Max: 102   Respiratory (!) 22 (02/02/24 1400) Resp  Min: 16  Max: 24   Non-Invasive  Blood Pressure 108/58 (02/02/24 1700) BP  Min: 105/61  Max: 148/52   Pulse Oximetry 91 % (02/02/24 1700) SpO2  Min: 91 %  Max: 97 %     Vital Today Admitted   Weight 46.8 kg (103 lb 2.8 oz) (02/02/24 0500) Weight:  (UTO) (01/30/24 2038)   Height N/A Height: 5' 3\" (160 cm) (01/31/24 0100)   Body Mass Index N/A BMI (Calculated): 18.71 (01/31/24 0100)       PHYSICAL EXAMINATION  GENERAL: Alert, not ill appearing, non toxic  HEENT: atraumatic, no cervical lymphadenopathy  CV/CHEST:RRR, no murmur,   RESP: Resp easy / unlabored, no advenitious breath sounds.  No wheezing or rhonchi.  ABDOMEN: soft, NT, + BS.  No acute peritoneal signs.  EXTREMITIES: 3+ BLE pitting edema,  DP 2+ bilaterally.  SKIN: Warm/ dry, no wounds  NEURO: Alert/oriented x 3, CN intact, exam is non focal.  BUE/BLE 5/5, no weakness or drift.  Speech clear and comprehensible PERRLA, EOMI.  Moves extremities.       Labs     Recent Results (from the past 24 hour(s))   Magnesium    Collection Time: 02/02/24  5:28 AM   Result Value Ref Range    Magnesium 2.1 1.7 - 2.4 mg/dL   Basic Metabolic Panel    Collection Time: 02/02/24  5:28 AM   Result Value Ref Range    Fasting Status      Sodium 137 135 - 145 mmol/L    Potassium 4.5 3.4 - 5.1 mmol/L    Chloride 102 97 - 110 mmol/L    Carbon Dioxide 35 (H) 21 - 32 mmol/L    Anion Gap 5 (L) 7 - 19 mmol/L    Glucose 106 (H) 70 - 99 mg/dL    BUN 29 (H) 6 - 20 mg/dL    Creatinine 0.52 0.51 - 0.95 mg/dL    Glomerular Filtration Rate >90 >=60    BUN/Cr 56 (H) 7 - 25    Calcium 8.4 8.4 - 10.2 mg/dL   CBC No Differential    Collection Time: 02/02/24  5:28 AM   Result Value Ref Range    WBC 5.1 4.2 - 11.0 K/mcL    RBC 4.53 4.00 - 5.20 mil/mcL    HGB 13.1 12.0 - 15.5 g/dL    HCT 42.3 36.0 - 46.5 %    MCV 93.4 78.0 - 100.0 fl    MCH 28.9 26.0 - 34.0 pg    MCHC 31.0 (L) 32.0 - 36.5 g/dL     140 - 450 K/mcL    RDW-CV 14.6 11.0 - 15.0 %    RDW-SD 50.4 (H) 39.0 - 50.0 fL    NRBC 0 <=0 /100 WBC        Imaging    Results for orders placed or performed during the hospital encounter of 01/30/24 (from the past 72 hour(s))   XR CHEST PA OR AP 1 VIEW    Impression    FINDINGS/IMPRESSION:    Mild to moderate bilateral pleural effusions, with probable adjacent  atelectasis/consolidation. Diffuse interstitial prominence noted.    Normal cardiac size. No pneumothorax.      Electronically Signed by: ELIZ SNOW M.D.   Signed on: 1/30/2024 9:56 PM   Workstation ID: [id number]   US VASC LOWER EXTREMITY VENOUS DUPLEX BILATERAL    Impression    1.   No ultrasound evidence of deep venous thrombosis.      Electronically Signed by: ALEJANDRO FARLEY MD   Signed on: 1/30/2024 11:56 PM    Workstation ID: [id number]       ASSESSMENT      # Acute hypoxic respiratory failure due to acute exacerbation of chronic heart failure, etiology unclear     # Bilateral pleural effusion due to above     # Acute non-STEMI due to demand ischemia from above     # PSVT noted on telemetry    # Severe aortic stenosis  CV surgery following, considering SAVR vs TAVR  Carotid duplex and non contrast CT chest to risk strartify     # Possible malnutrition/weight loss     # Right parotid mass/swelling  Patient currently refusing any workup inpatient.  She is refusing any consult or test for this despite education and counseling.  Patient educated to follow-up with primary and consultants as needed outpatient on a priority basis.  She is also educated Mr. Delay in diagnosis which can delay to treatment in case of cancer can increase risk of morbidity or even mortality/death.  Patient verbalizes understanding of above.    # Severe Malnutrition - present on admission   RD consult, supplement     COMORBIDITIES     History - past medical        Past Medical History:   Diagnosis Date    No known problems                    PLAN      Continue diuresis with IV Lasix twice daily  Beta-blocker initiated  Cardiology considering adding ARB/Entresto later  Echocardiogram with severe aortic stenosis. EF 60%  S/p NATALYA and right & left cath- Moderate coronary artery disease in the mid to distal left anterior descending artery   Telemetry monitoring  Daily weights, monitor electrolytes and urine output  CHF education, referral to CHF clinic  Monitor intake and output  Titrate oxygen to maintain saturation at 90% above  Cardiology following  CV surgery consulted- planning to obtain carotid duplex and non contrast CT chest to further risk stratify     DVT prophylaxis: SCD, heparin  Pulmonary: Cough and deep breathing, incentive spirometer     Disposition: pending hospital course, clinical improvemnet  JENNIFER: Unclear    Code status:  Full  Primary Care Physician: No, PCP       Case reviewed, including decision making with Dr. Mabry  All patient studies and labs have been reviewed.  All patient's questions and concerns were addressed.  D/w nurse  A face to face visit took place today     Sean Abreu CNP  [address] [place] [address] [phone]            07-Mar-2022 05:25

## 2024-07-29 ENCOUNTER — APPOINTMENT (OUTPATIENT)
Dept: ORTHOPEDIC SURGERY | Facility: CLINIC | Age: 34
End: 2024-07-29
Payer: COMMERCIAL

## 2024-07-29 VITALS — BODY MASS INDEX: 38.41 KG/M2 | WEIGHT: 225 LBS | HEIGHT: 64 IN

## 2024-07-29 DIAGNOSIS — S93.492A SPRAIN OF OTHER LIGAMENT OF LEFT ANKLE, INITIAL ENCOUNTER: ICD-10-CM

## 2024-07-29 PROCEDURE — 99204 OFFICE O/P NEW MOD 45 MIN: CPT

## 2024-07-29 PROCEDURE — 73610 X-RAY EXAM OF ANKLE: CPT | Mod: LT

## 2024-07-30 NOTE — DATA REVIEWED
[FreeTextEntry1] : 3 views of the patient's left ankle ordered and reviewed by me personally.  X-rays not demonstrate any fracture or dislocation.  The ankle joint is congruent.

## 2024-07-30 NOTE — HISTORY OF PRESENT ILLNESS
[de-identified] : This is a very pleasant 34-year-old patient comes seen regards to her left ankle.  This is really of a acute on chronic issue.  The patient's had persistent pain and swelling and instability involving her left ankle after sprain a year and a half ago.  She reports pain and swelling over the lateral aspect of her ankle.  She reports instability with standing and over uneven ground.  She is unable to wear any shoe other than a sneaker for an extended period of time on account of swelling and instability.

## 2024-07-30 NOTE — DISCUSSION/SUMMARY
[de-identified] : I discussed the patient's clinical radiographic findings with her.  The patient has up to this point not had alleviation of her symptoms with conservative management and I would like to order an MRI at this time the patient's left ankle.  Following the MRI I will discuss the results with her over the phone.  All questions sought and answered.

## 2024-07-30 NOTE — PHYSICAL EXAM
[de-identified] : She is alert oriented x 3.  She is pleasant cooperative.  I examined her left lower extremity.  Her ankle is swollen palpable to the contralateral side.  She has pain with deep palpation over the anterolateral ankle.  She is an unstable ankle with anterior drawer and talar tilt test.  Compartments are soft compressible.  She is neurovascular intact distally.

## 2025-04-04 ENCOUNTER — OUTPATIENT (OUTPATIENT)
Dept: OUTPATIENT SERVICES | Facility: HOSPITAL | Age: 35
LOS: 1 days | End: 2025-04-04
Payer: COMMERCIAL

## 2025-04-04 DIAGNOSIS — R92.8 OTHER ABNORMAL AND INCONCLUSIVE FINDINGS ON DIAGNOSTIC IMAGING OF BREAST: ICD-10-CM

## 2025-04-04 DIAGNOSIS — Z12.31 ENCOUNTER FOR SCREENING MAMMOGRAM FOR MALIGNANT NEOPLASM OF BREAST: ICD-10-CM

## 2025-04-04 PROCEDURE — G0279: CPT | Mod: 26

## 2025-04-04 PROCEDURE — 77066 DX MAMMO INCL CAD BI: CPT

## 2025-04-04 PROCEDURE — G0279: CPT

## 2025-04-04 PROCEDURE — 77066 DX MAMMO INCL CAD BI: CPT | Mod: 26

## 2025-04-04 PROCEDURE — 76642 ULTRASOUND BREAST LIMITED: CPT | Mod: 26,RT

## 2025-04-04 PROCEDURE — 76642 ULTRASOUND BREAST LIMITED: CPT | Mod: RT

## 2025-04-05 DIAGNOSIS — R92.8 OTHER ABNORMAL AND INCONCLUSIVE FINDINGS ON DIAGNOSTIC IMAGING OF BREAST: ICD-10-CM
